# Patient Record
Sex: FEMALE | Race: BLACK OR AFRICAN AMERICAN | NOT HISPANIC OR LATINO | URBAN - METROPOLITAN AREA
[De-identification: names, ages, dates, MRNs, and addresses within clinical notes are randomized per-mention and may not be internally consistent; named-entity substitution may affect disease eponyms.]

---

## 2024-09-27 NOTE — PROGRESS NOTES
Subjective   Patient ID: Lulu Celaya is a 64 y.o. female    Chief Complaint: No chief complaint on file.       Last Surgery: No surgery found  Date of Last Surgery: No surgery found    HPI  Lulu Celaya is a 64 y.o. right hand dominant female presenting for left shoulder pain. This is a second opinion visit.     She is here today with complaints of left shoulder pain. She explains she is a  and hurt herself originally lifting heavy containers of food. She was seen by [facility] with Dr. Porras who had her begin therapy. While attending therapy she began to experience swelling in her neck and had to stop. She then followed up with Dr. Osullivan who informed her that she needed a shoulder replacement. Her right side is beginning to hurt due to over compensation      Objective   Patient is a well-developed, well-nourished female  in no acute distress.  Breathes with normal chest rises.  Pupils are round and symmetric today.  Awake, alert, and oriented x3.      Examination of the left shoulder today reveals the skin to be intact. There is no sign of any atrophy, lesions, or abrasions. There is no pain to palpation of the bony prominences. Cervical lymphadenopathy examined, and this was negative. Patient had 5 out of 5 wrist flexion, extension, and thumb extension bilaterally. Sensation was intact to light touch to median, ulnar, radial axillary, and musculocutaneous nerves bilaterally. Positive radial pulse bilaterally. Provocative maneuvers on the left side today were negative.  Range of motion of the left shoulder revealed 0-170° of forward elevation, 0-60° of external rotation, and internal rotation was to T-12.     Examination of the right shoulder today reveals the skin to be intact. There is no sign of any atrophy, lesions, or abrasions. There is no pain to palpation of the bony prominences. Cervical lymphadenopathy examined, and this was negative. Patient had 5 out of 5 wrist flexion,  extension, and thumb extension, bilaterally. Sensation was intact to light touch to median, ulnar, radial, axillary, and musculocutaneous nerves bilaterally. Positive radial pulse bilaterally. Provocative maneuvers on the right side today were negative.  Range of motion of the right shoulder revealed 0-170° of forward elevation, 0-60° of external rotation, and internal rotation up to T-12.    Imaging:  MRI of the left shoulder taken personally interpreted by me show the anterior and posterior rotator cuffs are intact. Superior rotator cuff tear retracted to the level of the glenoid. Grade 1 changes to the supraspinatus and infraspinatus    Assessment/Plan   No diagnosis found.  Patient with left shoulder pain that is consistent with a rotator cuff tear, that is a direct result of her original worker's compensation injury.     Patient is status post injury of their left shoulder. Recent MRI reveals a superior rotator cuff tear retracted to the level of the glenoid. Patient had a normal shoulder prior to injury. Through the most recent literature we know that early detection and treatment of rotator cuff tears have a better outcome and function long term. They have failed conservative management of anti-inflammatories, physical therapy and injections. The risks, benefits and alternatives of shoulder surgery were discussed at length. The risks of surgery are infection, dislocation, nerve injury, blood loss. The benefits are pain relief and restoration of function. The patient verbalize an understanding of the risks, benefits, alternatives and the expected outcomes and wishes to proceed with elective shoulder surgery at this time. The rehabilitation after surgery was discussed at length today. Rehabilitation after rotator cuff repair lasting a total of 5-6 months after surgery. Lifting is slowly progressed. They will be in a sling for the first month. The following 2-3 months would work on range of motion. No  strengthening until 3 months from surgery.     Patient requires lifting of over 10 pounds with their job, so they will have to either remain off work until restrictions are removed or go back with light duty work.    They will be scheduled for a pre operative follow up visit with RADHA Russell. This will be out-patient surgery. They will be fitted for the post operative sling at that time. They will get updated blood work done within 30 days of surgical date. They will need to be seen and cleared by the Anesthesia team prior to surgery date. All patient's questions were answered today to their satisfaction and they are in agreement with the above plan. They know how to reach the office if there are any additional questions prior to surgery date.     PLAN for LEFT shoulder arthroscopic decompression, debridement, rotator cuff repair with patch implant augmentation and open biceps tenodesis     We had a long discussion regarding her diagnosis. We talked about her treatment options. It is my medical opinion that we can repair her rotator cuff. We will submit a C9 request and she will bee seen by Ella for her pre operative visit. She understands that she cannot be seen until worker's compensation approves her surgery.     No orders of the defined types were placed in this encounter.        Follow up will be scheduled appropriately.     Scribe Attestation  By signing my name below, IRobyn Scribe   attest that this documentation has been prepared under the direction and in the presence of Gaurang Meyer MD.

## 2024-10-03 ENCOUNTER — HOSPITAL ENCOUNTER (OUTPATIENT)
Dept: RADIOLOGY | Facility: HOSPITAL | Age: 64
Discharge: HOME | End: 2024-10-03
Payer: COMMERCIAL

## 2024-10-03 ENCOUNTER — OFFICE VISIT (OUTPATIENT)
Dept: ORTHOPEDIC SURGERY | Facility: HOSPITAL | Age: 64
End: 2024-10-03
Payer: COMMERCIAL

## 2024-10-03 DIAGNOSIS — M25.512 LEFT SHOULDER PAIN, UNSPECIFIED CHRONICITY: ICD-10-CM

## 2024-10-03 DIAGNOSIS — M25.512 LEFT SHOULDER PAIN, UNSPECIFIED CHRONICITY: Primary | ICD-10-CM

## 2024-10-03 PROCEDURE — 99214 OFFICE O/P EST MOD 30 MIN: CPT | Performed by: ORTHOPAEDIC SURGERY

## 2024-10-03 PROCEDURE — 99204 OFFICE O/P NEW MOD 45 MIN: CPT | Performed by: ORTHOPAEDIC SURGERY

## 2024-10-31 ENCOUNTER — OFFICE VISIT (OUTPATIENT)
Dept: ORTHOPEDIC SURGERY | Facility: HOSPITAL | Age: 64
End: 2024-10-31
Payer: COMMERCIAL

## 2024-10-31 VITALS — BODY MASS INDEX: 35.26 KG/M2 | HEIGHT: 63 IN | WEIGHT: 199 LBS

## 2024-10-31 DIAGNOSIS — S46.012A TRAUMATIC COMPLETE TEAR OF LEFT ROTATOR CUFF, INITIAL ENCOUNTER: Primary | ICD-10-CM

## 2024-10-31 PROCEDURE — 99214 OFFICE O/P EST MOD 30 MIN: CPT | Performed by: NURSE PRACTITIONER

## 2024-10-31 PROCEDURE — L3670 SO ACRO/CLAV CAN WEB PRE OTS: HCPCS | Performed by: NURSE PRACTITIONER

## 2024-10-31 RX ORDER — ATORVASTATIN CALCIUM 20 MG/1
TABLET, FILM COATED ORAL
COMMUNITY
Start: 2024-10-30

## 2024-10-31 RX ORDER — LISINOPRIL 10 MG/1
TABLET ORAL
COMMUNITY
Start: 2024-09-06

## 2024-11-04 PROBLEM — S46.012A TRAUMATIC COMPLETE TEAR OF LEFT ROTATOR CUFF: Status: ACTIVE | Noted: 2024-10-31

## 2024-11-04 NOTE — PROGRESS NOTES
Provider Impression/Assessment:  Patient with left shoulder pain that is consistent with a rotator cuff tear, that is a direct result of her original worker's compensation injury.     Patient Discussion/Plan:  Lulu Celaya is status post injury of their left shoulder. Patient had a normal shoulder prior to injury. Through the most recent literature we know that early detection and treatment of rotator cuff tears have a better outcome and function long term. They have failed conservative management of anti-inflammatories, physical therapy and injections. The risks, benefits and alternatives of shoulder surgery were discussed at length with Dr Meyer previously. These were reviewed today. The risks of surgery are infection, dislocation, nerve injury, blood loss. The benefits are pain relief and restoration of function. The patient verbalize an understanding of the risks, benefits, alternatives and the expected outcomes and wishes to proceed with elective shoulder surgery at this time. The rehabilitation after surgery was discussed at length today. Rehabilitation after rotator cuff repair lasting a total of 5-6 months after surgery. Lifting is slowly progressed. They will be in a sling for the first month. The following 2-3 months would work on range of motion. No strengthening until 3 months from surgery.     Patient requires lifting of over 20 pounds with their job, so they will have to either remain off work until restrictions are removed or go back with light duty work.    PLAN for LEFT shoulder arthroscopic decompression, debridement, rotator cuff repair with patch implant augmentation and open biceps tenodesis     At this point we will plan for surgery on 1/23/25 at [facility]. We gave the patient a handout today on arthroscopic rotator cuff repair. This will be out-patient surgery. They were fitted for the post operative sling today. They will need to be seen and cleared by the  Anesthesia team prior to surgery date.     All patient's questions were answered today to their satisfaction and they are in agreement with the above plan. They know how to reach the office if there are any additional questions prior to surgery date. Patient was discussed with Dr Meyer and he is in agreement with the plan.     Should you have any questions or concerns after your visit today, please do not hesitate to call the office at [phone]. I am honored to be a provider on your health care team and remain dedicated to helping you achieve your healthcare goals  -------------------------------------------------------------------------------------------------  CHIEF COMPLAINT:  FUV - LEFT SHOULDER  Pre-Op Evaluation  [facility]    History of Present Illness:  10/3/24  Lulu Celaya is a 64 y.o. right hand dominant female presenting for left shoulder pain. This is a second opinion visit.      She is here today with complaints of left shoulder pain. She explains she is a  and hurt herself originally lifting heavy containers of food. She was seen by [facility] with Dr. Porras who had her begin therapy. While attending therapy she began to experience swelling in her neck and had to stop. She then followed up with Dr. Osullivan who informed her that she needed a shoulder replacement. Her right side is beginning to hurt due to over compensation      10/31/24  Lulu is a pleasant 64 y.o. right hand dominant female returning today for evaluation of their left shoulder. This is a Workers Compensation injury. She is here today to schedule arthroscopic shoulder surgery for her left side. She has now been approved through Workers Compensation to undergo surgery. She has been approved for her post operative physical therapy as well as her sling. We will get her the sling today. She has not been approved for the ice therapy machine. We will submit for this today. She continues to have daily pain with her  left shoulder. It wakes her from sleep. Denies issues with her right shoulder today. She has completed formal physical therapy at this point without any further improvement.     Denies any current heart or lung issues  Smoker   Non Diabetic  BMI: 35    Review of Systems:   Review of systems for all 14 systems is negative for complaint except for the above noted findings in the history of present illness.    Family, social, and medical histories are obtained and reviewed.    Allergies:  No Known Allergies    No past medical history on file.    No past surgical history on file.    Social History     Socioeconomic History    Marital status: Single     Spouse name: Not on file    Number of children: Not on file    Years of education: Not on file    Highest education level: Not on file   Occupational History    Not on file   Tobacco Use    Smoking status: Some Days     Types: Cigarettes    Smokeless tobacco: Never   Substance and Sexual Activity    Alcohol use: Not on file    Drug use: Not on file    Sexual activity: Not on file   Other Topics Concern    Not on file   Social History Narrative    Not on file     Social Drivers of Health     Financial Resource Strain: Not on file   Food Insecurity: Not on file   Transportation Needs: Not on file   Physical Activity: Not on file   Stress: Not on file   Social Connections: Not on file   Intimate Partner Violence: Not on file   Housing Stability: Not on file       Medications:    Current Outpatient Medications:     atorvastatin (Lipitor) 20 mg tablet, , Disp: , Rfl:     lisinopril 10 mg tablet, , Disp: , Rfl:     Family History:  No family history on file.      Diagnoses/Problems:  Pre-operative evaluation  Left rotator cuff tear    Physical Examination:  Patient is a well-developed, well-nourished female in no acute distress. Breathes with normal chest rises. Pupils are round and symmetric today. Awake, alert, and oriented x3. Patient had 5 out of 5 wrist flexors extension,  thumb extension bilaterally.      Examination of the right shoulder today reveals the skin to be intact. There is no sign any atrophy lesions or abrasions. There is no pain to palpation of the bony prominences. Provocative maneuvers on the right side today were negative. Range of motion of the left shoulder revealed 0-170° of forward elevation. 0-60° of external rotation. Internal rotation was to T 12.     Examination of the left shoulder today reveals the skin to be intact. There is no sign any atrophy lesions or abrasions. Pain to palpation along the greater tuberosity. Palpable pain over bicipital groove. Range of motion of the left shoulder revealed 90° of forward elevation, correctable to 130° with pain. 50° of external rotation. Internal rotation to L3 with pain. +Neers sign. +Sanchez.     Results/Data  MRI of the left shoulder shows anterior and posterior rotator cuffs are intact. Superior rotator cuff tear retracted to the level of the glenoid. Grade 1 changes to the supraspinatus and infraspinatus. Previously reviewed with Dr Meyer.      MEDICAL EQUIPMENT:  Patient was prescribed a shoulder sling for postoperative care. The patient will have weakness, instability and/or deformity of their left arm which requires stabilization from this orthosis to improve their function after surgery.     Verbal and written instructions for the use, wear schedule, cleaning and application of this item were given. Patient was instructed that should the brace result in increased pain, decreased sensation, increased swelling, or an overall worsening of their medical condition, to please contact our office immediately at [phone].     Orthotic management and training was provided for skin care, modifications due to healing tissues, edema changes, interruption in skin integrity, and safety precautions with the orthosis.      *While intending to generate a timely document that accurately reflects the content of the visit,  no guarantee can be provided that every grammatical or spelling mistake has been or will be identified or corrected. Thank you for your understanding.

## 2024-11-19 ENCOUNTER — APPOINTMENT (OUTPATIENT)
Dept: ORTHOPEDIC SURGERY | Facility: HOSPITAL | Age: 64
End: 2024-11-19
Payer: COMMERCIAL

## 2024-12-03 DIAGNOSIS — M75.102 TEAR OF LEFT ROTATOR CUFF, UNSPECIFIED TEAR EXTENT, UNSPECIFIED WHETHER TRAUMATIC: Primary | ICD-10-CM

## 2024-12-13 ENCOUNTER — APPOINTMENT (OUTPATIENT)
Dept: PREADMISSION TESTING | Facility: HOSPITAL | Age: 64
End: 2024-12-13
Payer: COMMERCIAL

## 2025-01-03 ENCOUNTER — PRE-ADMISSION TESTING (OUTPATIENT)
Dept: PREADMISSION TESTING | Facility: HOSPITAL | Age: 65
End: 2025-01-03
Payer: COMMERCIAL

## 2025-01-03 VITALS
BODY MASS INDEX: 35.94 KG/M2 | HEART RATE: 86 BPM | WEIGHT: 202.82 LBS | SYSTOLIC BLOOD PRESSURE: 112 MMHG | TEMPERATURE: 98.6 F | HEIGHT: 63 IN | RESPIRATION RATE: 18 BRPM | OXYGEN SATURATION: 97 % | DIASTOLIC BLOOD PRESSURE: 80 MMHG

## 2025-01-03 DIAGNOSIS — S46.012A TRAUMATIC COMPLETE TEAR OF LEFT ROTATOR CUFF, INITIAL ENCOUNTER: ICD-10-CM

## 2025-01-03 DIAGNOSIS — Z01.818 PREOP TESTING: Primary | ICD-10-CM

## 2025-01-03 LAB
ALBUMIN SERPL BCP-MCNC: 4.2 G/DL (ref 3.4–5)
ALP SERPL-CCNC: 78 U/L (ref 33–136)
ALT SERPL W P-5'-P-CCNC: 15 U/L (ref 7–45)
ANION GAP SERPL CALC-SCNC: 13 MMOL/L (ref 10–20)
AST SERPL W P-5'-P-CCNC: 14 U/L (ref 9–39)
BILIRUB SERPL-MCNC: 0.8 MG/DL (ref 0–1.2)
BUN SERPL-MCNC: 20 MG/DL (ref 6–23)
CALCIUM SERPL-MCNC: 8.9 MG/DL (ref 8.6–10.3)
CHLORIDE SERPL-SCNC: 98 MMOL/L (ref 98–107)
CO2 SERPL-SCNC: 27 MMOL/L (ref 21–32)
CREAT SERPL-MCNC: 0.79 MG/DL (ref 0.5–1.05)
EGFRCR SERPLBLD CKD-EPI 2021: 83 ML/MIN/1.73M*2
ERYTHROCYTE [DISTWIDTH] IN BLOOD BY AUTOMATED COUNT: 12.6 % (ref 11.5–14.5)
GLUCOSE SERPL-MCNC: 105 MG/DL (ref 74–99)
HCT VFR BLD AUTO: 39.5 % (ref 36–46)
HGB BLD-MCNC: 12.8 G/DL (ref 12–16)
MCH RBC QN AUTO: 30.3 PG (ref 26–34)
MCHC RBC AUTO-ENTMCNC: 32.4 G/DL (ref 32–36)
MCV RBC AUTO: 94 FL (ref 80–100)
NRBC BLD-RTO: NORMAL /100{WBCS}
PLATELET # BLD AUTO: 309 X10*3/UL (ref 150–450)
POTASSIUM SERPL-SCNC: 3 MMOL/L (ref 3.5–5.3)
PROT SERPL-MCNC: 7.2 G/DL (ref 6.4–8.2)
RBC # BLD AUTO: 4.22 X10*6/UL (ref 4–5.2)
SODIUM SERPL-SCNC: 135 MMOL/L (ref 136–145)
WBC # BLD AUTO: 7.4 X10*3/UL (ref 4.4–11.3)

## 2025-01-03 PROCEDURE — 84075 ASSAY ALKALINE PHOSPHATASE: CPT

## 2025-01-03 PROCEDURE — 36415 COLL VENOUS BLD VENIPUNCTURE: CPT

## 2025-01-03 PROCEDURE — 93005 ELECTROCARDIOGRAM TRACING: CPT

## 2025-01-03 PROCEDURE — 85027 COMPLETE CBC AUTOMATED: CPT

## 2025-01-03 RX ORDER — CHLORHEXIDINE GLUCONATE ORAL RINSE 1.2 MG/ML
15 SOLUTION DENTAL 2 TIMES DAILY
COMMUNITY
Start: 2024-12-12

## 2025-01-03 RX ORDER — IBUPROFEN 200 MG
600 TABLET ORAL EVERY 8 HOURS PRN
COMMUNITY

## 2025-01-03 RX ORDER — CHLORTHALIDONE 25 MG/1
25 TABLET ORAL
COMMUNITY
Start: 2024-09-06

## 2025-01-03 RX ORDER — ASPIRIN 81 MG/1
81 TABLET ORAL DAILY PRN
COMMUNITY

## 2025-01-03 ASSESSMENT — DUKE ACTIVITY SCORE INDEX (DASI)
CAN YOU DO YARD WORK LIKE RAKING LEAVES, WEEDING OR PUSHING A MOWER: NO
CAN YOU WALK INDOORS, SUCH AS AROUND YOUR HOUSE: YES
CAN YOU CLIMB A FLIGHT OF STAIRS OR WALK UP A HILL: YES
CAN YOU HAVE SEXUAL RELATIONS: NO
CAN YOU DO HEAVY WORK AROUND THE HOUSE LIKE SCRUBBING FLOORS OR LIFTING AND MOVING HEAVY FURNITURE: NO
CAN YOU PARTICIPATE IN MODERATE RECREATIONAL ACTIVITIES LIKE GOLF, BOWLING, DANCING, DOUBLES TENNIS OR THROWING A BASEBALL OR FOOTBALL: NO
DASI METS SCORE: 6.1
CAN YOU DO LIGHT WORK AROUND THE HOUSE LIKE DUSTING OR WASHING DISHES: YES
CAN YOU PARTICIPATE IN STRENOUS SPORTS LIKE SWIMMING, SINGLES TENNIS, FOOTBALL, BASKETBALL, OR SKIING: NO
CAN YOU WALK A BLOCK OR TWO ON LEVEL GROUND: YES
TOTAL_SCORE: 26.95
CAN YOU DO MODERATE WORK AROUND THE HOUSE LIKE VACUUMING, SWEEPING FLOORS OR CARRYING GROCERIES: YES
CAN YOU TAKE CARE OF YOURSELF (EAT, DRESS, BATHE, OR USE TOILET): YES
CAN YOU RUN A SHORT DISTANCE: YES

## 2025-01-03 ASSESSMENT — ENCOUNTER SYMPTOMS
CONSTITUTIONAL NEGATIVE: 1
CARDIOVASCULAR NEGATIVE: 1
GASTROINTESTINAL NEGATIVE: 1
EYES NEGATIVE: 1
RESPIRATORY NEGATIVE: 1
NECK NEGATIVE: 1
ENDOCRINE NEGATIVE: 1
NEUROLOGICAL NEGATIVE: 1

## 2025-01-03 ASSESSMENT — LIFESTYLE VARIABLES: SMOKING_STATUS: SMOKER

## 2025-01-03 ASSESSMENT — PAIN SCALES - GENERAL: PAINLEVEL_OUTOF10: 7

## 2025-01-03 ASSESSMENT — PAIN DESCRIPTION - DESCRIPTORS: DESCRIPTORS: ACHING;SHARP

## 2025-01-03 ASSESSMENT — PAIN - FUNCTIONAL ASSESSMENT: PAIN_FUNCTIONAL_ASSESSMENT: 0-10

## 2025-01-03 NOTE — PREPROCEDURE INSTRUCTIONS
Medication List            Accurate as of January 3, 2025  3:16 PM. Always use your most recent med list.                aspirin 81 mg EC tablet  Additional Medication Adjustments for Surgery: Take last dose 7 days before surgery  Notes to patient: Last dose 01/15/2025     atorvastatin 20 mg tablet  Commonly known as: Lipitor  Medication Adjustments for Surgery: Take/Use as prescribed     chlorhexidine 0.12 % solution  Commonly known as: Peridex     chlorthalidone 25 mg tablet  Commonly known as: Hygroton  Medication Adjustments for Surgery: Take/Use as prescribed     ibuprofen 200 mg tablet  Additional Medication Adjustments for Surgery: Take last dose 7 days before surgery  Notes to patient: Last dose 01/15/2025     lisinopril 10 mg tablet  Medication Adjustments for Surgery: Take last dose 1 day (24 hours) before surgery  Notes to patient: Last dose 1/22/25 morning              Thank you for visiting [facility].  If you have any changes to your health condition, please call the surgeons office to alert them and give them details of your symptoms.    La Nena Ordonez PA-C  P: [phone]  Department of Anesthesiology and Perioperative Medicine  --    Preoperative Fasting Guidelines    Why must I stop eating and drinking near surgery time?  With sedation, food or liquid in your stomach can enter your lungs causing serious complications  Increases nausea and vomiting    When do I need to stop eating and drinking before my surgery?  Do not eat any food after midnight the night before your surgery/procedure.  You may have up to 13.5 ounces of clear liquid until TWO hours before your instructed arrival time to the hospital.  This includes water, black tea/coffee, (no milk or cream) apple juice, and electrolyte drinks (Gatorade)  You may chew gum until TWO hours before your surgery/procedure              Preoperative Brain Exercises    What are brain exercises?  A brain exercise is any activity  that engages your thinking (cognitive) skills.    What types of activities are considered brain exercises?  Jigsaw puzzles, crossword puzzles, word jumble, memory games, word search, and many more.  Many can be found free online or on your phone via a mobile sweetie.    Why should I do brain exercises before my surgery?  More recent research has shown brain exercise before surgery can lower the risk of postoperative delirium (confusion) which can be especially important for older adults.  Patients who did brain exercises for 5 to 10 hours the days before surgery, cut their risk of postoperative delirium in half up to 1 week after surgery.                  [facility]    Preoperative Deep Breathing Exercises    Why it is important to do deep breathing exercises before my surgery?  Deep breathing exercises strengthen your breathing muscles.  This helps you to recover after your surgery and decreases the chance of breathing complications.      How are the deep breathing exercises done?  Sit straight with your back supported.  Breathe in deeply and slowly through your nose. Your lower rib cage should expand and your abdomen may move forward.  Hold that breath for 3 to 5 seconds.  Breathe out through pursed lips, slowly and completely.  Rest and repeat 10 times every hour while awake.  Rest longer if you become dizzy or lightheaded.      Patient Information: Incentive Spirometer  What is an incentive spirometer?  An incentive spirometer is a device used before and after surgery to “exercise” your lungs.  It helps you to take deeper breaths to expand your lungs.  Below is an example of a basic incentive spirometer.  The device you receive may differ slightly but they all function the same.    Why do I need to use an incentive spirometer?  Using your incentive spirometer prepares your lungs for surgery and helps prevent lung problems after surgery.  How do I use my incentive spirometer?  When you're  using your incentive spirometer, make sure to breathe through your mouth. If you breathe through your nose, the incentive spirometer won't work properly. You can hold your nose if you have trouble.  If you feel dizzy at any time, stop and rest. Try again at a later time.  Follow the steps below:  Set up your incentive spirometer, expand the flexible tubing and connect to the outlet.  Sit upright in a chair or bed. Hold the incentive spirometer at eye level.   Put the mouthpiece in your mouth and close your lips tightly around it. Slowly breathe out (exhale) completely.  Breathe in (inhale) slowly through your mouth as deeply as you can. As you take a breath, you will see the piston rise inside the large column. While the piston rises, the indicator should move upwards. It should stay in between the 2 arrows (see Figure).  Try to get the piston as high as you can, while keeping the indicator between the arrows.   If the indicator doesn't stay between the arrows, you're breathing either too fast or too slow.  When you get it as high as you can, hold your breath for 10 seconds, or as long as possible. While you're holding your breath, the piston will slowly fall to the base of the spirometer.  Once the piston reaches the bottom of the spirometer, breathe out slowly through your mouth. Rest for a few seconds.  Repeat 10 times. Try to get the piston to the same level with each breath.  Repeat every hour while awake  You can carefully clean the outside of the mouthpiece with an alcohol wipe or soap and water.            Patient and Family Education             Ways You Can Help Prevent Blood Clots             This handout explains some simple things you can do to help prevent blood clots.      Blood clots are blockages that can form in the body's veins. When a blood clot forms in your deep veins, it may be called a deep vein thrombosis, or DVT for short. Blood clots can happen in any part of the body where blood flows,  but they are most common in the arms and legs. If a piece of a blood clot breaks free and travels to the lungs, it is called a pulmonary embolus (PE). A PE can be a very serious problem.         Being in the hospital or having surgery can raise your chances of getting a blood clot because you may not be well enough to move around as much as you normally do.         Ways you can help prevent blood clots in the hospital         Wearing SCDs. SCDs stands for Sequential Compression Devices.   SCDs are special sleeves that wrap around your legs  They attach to a pump that fills them with air to gently squeeze your legs every few minutes.   This helps return the blood in your legs to your heart.   SCDs should only be taken off when walking or bathing.   SCDs may not be comfortable, but they can help save your life.               Wearing compression stockings - if your doctor orders them. These special snug fitting stockings gently squeeze your legs to help blood flow.       Walking. Walking helps move the blood in your legs.   If your doctor says it is ok, try walking the halls at least   5 times a day. Ask us to help you get up, so you don't fall.      Taking any blood thinning medicines your doctor orders.             ©[facility]; 3/23       Ways you can help prevent blood clots at home       Wearing compression stockings - if your doctor orders them. ? Walking - to help move the blood in your legs.       Taking any blood thinning medicines your doctor orders.      Signs of a blood clot or PE      Tell your doctor or nurse know right away if you have of the problems listed below.    If you are at home, seek medical care right away. Call 911 for chest pain or problems breathing.               Signs of a blood clot (DVT) - such as pain,  swelling, redness or warmth in your arm or leg      Signs of a pulmonary embolism (PE) - such as chest     pain or feeling short of breath           The Week before Surgery         Seven days before Surgery  Check your C[facility] medication instructions  Do the exercises provided to you by CPM   Arrange for a responsible, adult licensed  to take you home after surgery and stay with you for 24 hours.  You will not be permitted to drive yourself home if you have received any anesthetic/sedation  Six days before surgery  Check your [facility]PM medication instructions  Do the exercises provided to you by [facility]   Start using Chlorhexidene (CHG) body wash if prescribed  Five days before surgery  Check your [facility] medication instructions  Do the exercises provided to you by [facility]   Continue to use CHG body wash if prescribed  Three days before surgery  Check your CPM medication instructions  Do the exercises provided to you by C[facility]   Continue to use CHG body wash if prescribed  Two days before surgery  Check your CPM medication instructions  Do the exercises provided to you by C[facility]   Continue to use CHG body wash if prescribed    The Day before Surgery       Check your CPM medication and all other CPM instructions including when to stop eating and drinking  You will be called with your arrival time for surgery in the late afternoon.  If you do not receive a call please reach out to your surgeon's office.  Do not smoke or drink 24 hours before surgery  Prepare items to bring with you to the hospital  Shower with your chlorhexidine wash if prescribed  Brush your teeth and use your chlorhexidine dental rinse if prescribed    The Day of Surgery       Check your CPM medication instructions  Ensure you follow the instructions for when to stop eating and drinking  Shower, if prescribed use CHG.  Do not apply any lotions, creams, moisturizers, perfume or deodorant  Brush your teeth and use your CHG dental rinse if prescribed  Wear loose comfortable clothing  Avoid make-up  Remove  jewelry and piercings, consider professional piercing removal with a plastic spacer if needed  Bring photo ID and Insurance card  Bring an  accurate medication list that includes medication dose, frequency and allergies  Bring a copy of your advanced directives (will, health care power of )  Bring any devices and controllers as well as medical devices you have been provided with for surgery (CPAP, slings, braces, etc.)  Dentures, eyeglasses, and contacts will be removed before surgery, please bring cases for contacts or glasses

## 2025-01-03 NOTE — CPM/PAT H&P
CPM/PAT Evaluation       Name: Lulu Celaya (Lulu Celaya)  /Age: 1960/64 y.o.     Visit Type:   In-Person       Chief Complaint: left shoulder pain    HPI: Patient is a 63 yo F scheduled for left shoulder arthroscopic rotator cuff repair with collagen implant, decompression, debridement and biceps tenodesis on 2025 with Dr. Meyer secondary to traumatic tear of left rotator cuff. Patient was referred by Dr. Meyer to [facility] today for perioperative risk stratification and optimization. Patient's PMHx is notable for HTN, HLD.      Past Medical History:   Diagnosis Date    Hyperlipidemia     Hypertension        Past Surgical History:   Procedure Laterality Date    COLONOSCOPY      UPPER GASTROINTESTINAL ENDOSCOPY         Patient  has no history on file for sexual activity.    Family History   Problem Relation Name Age of Onset    Breast cancer Mother      Heart disease Father      Heart attack Father      Heart disease Paternal Grandfather         No Known Allergies    Prior to Admission medications    Medication Sig Start Date End Date Taking? Authorizing Provider   atorvastatin (Lipitor) 20 mg tablet  10/30/24   Historical Provider, MD   lisinopril 10 mg tablet  24   Historical Provider, MD ASHTON ROS:   Constitutional:   neg    Neuro/Psych:   neg    Eyes:   neg    Ears:   neg    Nose:   neg    Mouth:   neg    Throat:   neg    Neck:   neg    Cardio:   neg    Respiratory:   neg    Endocrine:   neg    GI:   neg    :   neg    Musculoskeletal:    + left shoulder pain  Hematologic:   neg    Skin:  neg        Physical Exam  Vitals and nursing note reviewed.   Constitutional:       General: She is not in acute distress.     Appearance: She is not ill-appearing or toxic-appearing.   HENT:      Head: Normocephalic and atraumatic.      Nose: Nose normal.      Mouth/Throat:      Mouth: Mucous membranes are moist.   Eyes:      Extraocular Movements: Extraocular movements intact.       "Conjunctiva/sclera: Conjunctivae normal.   Neck:      Vascular: No carotid bruit.   Cardiovascular:      Rate and Rhythm: Normal rate and regular rhythm.      Pulses: Normal pulses.      Heart sounds: Normal heart sounds. No murmur heard.  Pulmonary:      Effort: Pulmonary effort is normal.      Breath sounds: Normal breath sounds.   Abdominal:      General: There is no distension.      Palpations: Abdomen is soft.      Tenderness: There is no abdominal tenderness.   Musculoskeletal:         General: Normal range of motion.      Cervical back: Normal range of motion.   Skin:     General: Skin is warm and dry.      Capillary Refill: Capillary refill takes less than 2 seconds.   Neurological:      General: No focal deficit present.      Mental Status: She is alert.   Psychiatric:         Mood and Affect: Mood normal.         Behavior: Behavior normal.          PAT AIRWAY:   Airway:     Mallampati::  II    TM distance::  >3 FB    Neck ROM::  Full   No bottom teeth        Visit Vitals  /80   Pulse 86   Temp 37 °C (98.6 °F) (Temporal)   Resp 18   Ht 1.6 m (5' 3\")   Wt 92 kg (202 lb 13.2 oz)   SpO2 97%   BMI 35.93 kg/m²   Smoking Status Some Days   BSA 2.02 m²       DASI Risk Score      Flowsheet Row Pre-Admission Testing from 1/3/2025 in [facility]   Can you take care of yourself (eat, dress, bathe, or use toilet)?  2.75 filed at 01/03/2025 1540   Can you walk indoors, such as around your house? 1.75 filed at 01/03/2025 1540   Can you walk a block or two on level ground?  2.75 filed at 01/03/2025 1540   Can you climb a flight of stairs or walk up a hill? 5.5 filed at 01/03/2025 1540   Can you run a short distance? 8 filed at 01/03/2025 1540   Can you do light work around the house like dusting or washing dishes? 2.7 filed at 01/03/2025 1540   Can you do moderate work around the house like vacuuming, sweeping floors or carrying groceries? 3.5 filed at 01/03/2025 1540   Can you do heavy work around " the house like scrubbing floors or lifting and moving heavy furniture?  0 filed at 01/03/2025 1540   Can you do yard work like raking leaves, weeding or pushing a mower? 0 filed at 01/03/2025 1540   Can you have sexual relations? 0 filed at 01/03/2025 1540   Can you participate in moderate recreational activities like golf, bowling, dancing, doubles tennis or throwing a baseball or football? 0 filed at 01/03/2025 1540   Can you participate in strenous sports like swimming, singles tennis, football, basketball, or skiing? 0 filed at 01/03/2025 1540   DASI SCORE 26.95 filed at 01/03/2025 1540   METS Score (Will be calculated only when all the questions are answered) 6.1 filed at 01/03/2025 1540          Caprini DVT Assessment      Flowsheet Row Pre-Admission Testing from 1/3/2025 in [facility]   DVT Score 7 filed at 01/03/2025 1539   Surgical Factors Major surgery planned, including arthroscopic and laproscopic (1-2 hours) filed at 01/03/2025 1539   BMI 31-40 (Obesity) filed at 01/03/2025 1539          Modified Frailty Index      Flowsheet Row Pre-Admission Testing from 1/3/2025 in [facility]   Non-independent functional status (problems with dressing, bathing, personal grooming, or cooking) 0 filed at 01/03/2025 1541   History of diabetes mellitus  0 filed at 01/03/2025 1541   History of COPD 0 filed at 01/03/2025 1541   History of CHF No filed at 01/03/2025 1541   History of MI 0 filed at 01/03/2025 1541   History of Percutaneous Coronary Intervention, Cardiac Surgery, or Angina No filed at 01/03/2025 1541   Hypertension requiring the use of medication  0.0909 filed at 01/03/2025 1541   Peripheral vascular disease 0 filed at 01/03/2025 1541   Impaired sensorium (cognitive impairement or loss, clouding, or delirium) 0 filed at 01/03/2025 1541   TIA or CVA withouy residual deficit 0 filed at 01/03/2025 1541   Cerebrovascular accident with deficit 0 filed at 01/03/2025 1541   Modified  Frailty Index Calculator .0909 filed at 01/03/2025 1541          CHADS2 Stroke Risk  Current as of 18 minutes ago        N/A 3 to 100%: High Risk   2 to < 3%: Medium Risk   0 to < 2%: Low Risk     Last Change: N/A          This score determines the patient's risk of having a stroke if the patient has atrial fibrillation.        This score is not applicable to this patient. Components are not calculated.          Revised Cardiac Risk Index      Flowsheet Row Pre-Admission Testing from 1/3/2025 in [facility]   High-Risk Surgery (Intraperitoneal, Intrathoracic,Suprainguinal vascular) 0 filed at 01/03/2025 1541   History of ischemic heart disease (History of MI, History of positive exercuse test, Current chest paint considered due to myocardial ischemia, Use of nitrate therapy, ECG with pathological Q Waves) 0 filed at 01/03/2025 1541   History of congestive heart failure (pulmonary edemia, bilateral rales or S3 gallop, Paroxysmal nocturnal dyspnea, CXR showing pulmonary vascular redistribution) 0 filed at 01/03/2025 1541   History of cerebrovascular disease (Prior TIA or stroke) 0 filed at 01/03/2025 1541   Pre-operative insulin treatment 0 filed at 01/03/2025 1541   Pre-operative creatinine>2 mg/dl 0 filed at 01/03/2025 1541   Revised Cardiac Risk Calculator 0 filed at 01/03/2025 1541          Apfel Simplified Score      Flowsheet Row Pre-Admission Testing from 1/3/2025 in [facility]   Smoking status 0 filed at 01/03/2025 1541   History of motion sickness or PONV  0 filed at 01/03/2025 1541   Use of postoperative opioids 0 filed at 01/03/2025 1541   Gender - Female 1=Yes filed at 01/03/2025 1541   Apfel Simplified Score Calculator 1 filed at 01/03/2025 1541          Risk Analysis Index Results This Encounter    No data found in the last 10 encounters.       Stop Bang Score      Flowsheet Row Pre-Admission Testing from 1/3/2025 in [facility]   Do you snore loudly? 1  filed at 01/03/2025 1514   Do you often feel tired or fatigued after your sleep? 0 filed at 01/03/2025 1514   Has anyone ever observed you stop breathing in your sleep? 0 filed at 01/03/2025 1514   Do you have or are you being treated for high blood pressure? 1 filed at 01/03/2025 1514   Recent BMI (Calculated) 35.9 filed at 01/03/2025 1514   Is BMI greater than 35 kg/m2? 1=Yes filed at 01/03/2025 1514   Age older than 50 years old? 1=Yes filed at 01/03/2025 1514   Is your neck circumference greater than 17 inches (Male) or 16 inches (Female)? 0 filed at 01/03/2025 1514   Gender - Male 0=No filed at 01/03/2025 1514   STOP-BANG Total Score 4 filed at 01/03/2025 1514          Prodigy: High Risk  Total Score: 8              Prodigy Age Score           ARISCAT Score for Postoperative Pulmonary Complications      Flowsheet Row Pre-Admission Testing from 1/3/2025 in [facility]   Age, years  3 filed at 01/03/2025 1542   Preoperative SpO2 0 filed at 01/03/2025 1542   Respiratory infection in the last month Either upper or lower (i.e., URI, bronchitis, pneumonia), with fever and antibiotic treatment 0 filed at 01/03/2025 1542   Preoperative anemoa (Hgb less than 10 g/dl) 0 filed at 01/03/2025 1542   Surgical incision  0 filed at 01/03/2025 1542   Duration of surgery  16 filed at 01/03/2025 1542   Emergency Procedure  0 filed at 01/03/2025 1542   ARISCAT Total Score  19 filed at 01/03/2025 1542          Valle Perioperative Risk for Myocardial Infarction or Cardiac Arrest (YEISON)      Flowsheet Row Pre-Admission Testing from 1/3/2025 in [facility]   Age 1.3 filed at 01/03/2025 1517   Functional Status  0 filed at 01/03/2025 1517   ASA Class  -3.29 filed at 01/03/2025 1517   Creatinine 0 filed at 01/03/2025 1517   Type of Procedure  0.80 filed at 01/03/2025 1517   YEISON Total Score  -6.44 filed at 01/03/2025 1517   YEISON % 0.16 filed at 01/03/2025 1517            Assessment & Plan    Neuro:   No  neurologic diagnoses on chart review, however, the patient is at an increased risk for post operative delirium secondary to age >/= 65.  Preoperative brain exercise educational handout provided to patient.    The patient is at an increased risk for perioperative stroke secondary to increased age, HTN, HLD, and female sex .    HEENT/Airway:   No diagnosis or significant findings on chart review or clinical presentation and evaluation.   STOP-BANG Score- 4 points high risk for LAW    Mallampati::  II    TM distance::  >3 FB    Neck ROM::  Full  Dentures-denies  Crowns-denies  Implants-denies    Recently had bottom teeth pulled, states it will be more than 6 weeks    Cardiovascular:    -HTN - on lisinopril (24 hour hold), chlorthalidone (continue)  -HLD - on atorvastatin (continue)    METS: 6.3  RCRI: 0 points, 3.9%    30 day risk of MACE (risk for cardiac death, nonfatal myocardial infarction, and nonfactal cardiac arrest  YEISON:  0.16  % risk of intraoperative or 30-day postoperative MACE  EKG -normal sinus rhythm LAFB; Rate 77 ; reviewed EKG with chika Sahu to proceed without further work up. No additional preoperative testing is currently indicated.    Pulmonary:     No diagnosis or significant findings on chart review or clinical presentation and evaluation.   ARISCAT: <26 points, 1.6% risk of in-hospital postoperative pulmonary complication  PRODIGY: Moderate risk for opioid induced respiratory depression  Smoking History- Smokes 3 cigarettes every few days  Preoperative deep breathing educational handout provided to patient.    Renal:  No diagnosis or significant findings on chart review or clinical presentation and evaluation, however, the patient is at increased risk of perioperative renal complications secondary to age>/= 56 and HTN. Preventative measures include BP monitoring, medication compliance, and hydration management.   CMP-Pending    Endocrine:  No diagnosis or significant findings on chart review  or clinical presentation and evaluation.     Hematologic:    No diagnosis or significant findings on chart review or clinical presentation and evaluation.  The patient is not a Jehovah’s witness and will accept blood and blood products if medically indicated.   History of previous blood transfusions No  CBC-Pending    Caprini Score 7 , patient at High for postoperative DVT. Pt supplied education/VTE handout  Anticoagulation use: No   Preoperative DVT educational handout provided to patient.    Gastrointestinal:     No diagnosis or significant findings on chart review or clinical presentation and evaluation.   Recreational drug use: Drug use No  Alcohol use social drinker    Apfel: 1 points 21% risk for post operative N/V    Infectious disease:    No diagnosis or significant findings on chart review or clinical presentation and evaluation.     Musculoskeletal:    -left rotator cuff tear- scheduled for surgery    Anesthesia:  ASA 2 - Patient with mild systemic disease with no functional limitations    History of General anesthesia- yes  Complications- No anesthesia complications  No family history of anesthesia complications    Nickel/metal allergy-negative  Shellfish allergy-negative    Discussed with patient medication instructions, NPO guidelines, and any questions or concerns.       La Nena Ordonez PA-C 1/3/2025 4:29 PM      Pending labs ordered:  cbc and comp  Follow up needed: labs., EKG review

## 2025-01-06 LAB
ATRIAL RATE: 77 BPM
P AXIS: 74 DEGREES
P OFFSET: 175 MS
P ONSET: 119 MS
PR INTERVAL: 180 MS
Q ONSET: 209 MS
QRS COUNT: 13 BEATS
QRS DURATION: 96 MS
QT INTERVAL: 452 MS
QTC CALCULATION(BAZETT): 511 MS
QTC FREDERICIA: 491 MS
R AXIS: -53 DEGREES
T AXIS: 49 DEGREES
T OFFSET: 435 MS
VENTRICULAR RATE: 77 BPM

## 2025-01-08 ENCOUNTER — DOCUMENTATION (OUTPATIENT)
Dept: PREADMISSION TESTING | Facility: HOSPITAL | Age: 65
End: 2025-01-08
Payer: COMMERCIAL

## 2025-01-08 NOTE — PROGRESS NOTES
PAT labs reviewed, K+ was noted to be 3.0. Patient on Chlorthalidone with no current PCP. Dr Meyer was notified. Labs were otherwise normal.       See Soto, JOS-CNP

## 2025-01-22 ENCOUNTER — ANESTHESIA EVENT (OUTPATIENT)
Dept: OPERATING ROOM | Facility: HOSPITAL | Age: 65
End: 2025-01-22
Payer: COMMERCIAL

## 2025-01-23 ENCOUNTER — ANESTHESIA (OUTPATIENT)
Dept: OPERATING ROOM | Facility: HOSPITAL | Age: 65
End: 2025-01-23
Payer: COMMERCIAL

## 2025-01-23 ENCOUNTER — HOSPITAL ENCOUNTER (OUTPATIENT)
Facility: HOSPITAL | Age: 65
Setting detail: OUTPATIENT SURGERY
Discharge: HOME | End: 2025-01-23
Attending: ORTHOPAEDIC SURGERY | Admitting: ORTHOPAEDIC SURGERY
Payer: COMMERCIAL

## 2025-01-23 VITALS
TEMPERATURE: 97.9 F | HEIGHT: 63 IN | RESPIRATION RATE: 12 BRPM | OXYGEN SATURATION: 92 % | SYSTOLIC BLOOD PRESSURE: 119 MMHG | BODY MASS INDEX: 36.25 KG/M2 | HEART RATE: 66 BPM | DIASTOLIC BLOOD PRESSURE: 73 MMHG | WEIGHT: 204.59 LBS

## 2025-01-23 DIAGNOSIS — Z98.890 POST-OPERATIVE STATE: ICD-10-CM

## 2025-01-23 DIAGNOSIS — S46.012A TRAUMATIC COMPLETE TEAR OF LEFT ROTATOR CUFF, INITIAL ENCOUNTER: Primary | ICD-10-CM

## 2025-01-23 DIAGNOSIS — G89.18 ACUTE POST-OPERATIVE PAIN: Primary | ICD-10-CM

## 2025-01-23 PROCEDURE — 29826 SHO ARTHRS SRG DECOMPRESSION: CPT | Performed by: NURSE PRACTITIONER

## 2025-01-23 PROCEDURE — 29827 SHO ARTHRS SRG RT8TR CUF RPR: CPT | Performed by: NURSE PRACTITIONER

## 2025-01-23 PROCEDURE — 29827 SHO ARTHRS SRG RT8TR CUF RPR: CPT | Performed by: ORTHOPAEDIC SURGERY

## 2025-01-23 PROCEDURE — 2500000004 HC RX 250 GENERAL PHARMACY W/ HCPCS (ALT 636 FOR OP/ED): Mod: JZ | Performed by: NURSE PRACTITIONER

## 2025-01-23 PROCEDURE — 3600000009 HC OR TIME - EACH INCREMENTAL 1 MINUTE - PROCEDURE LEVEL FOUR: Performed by: ORTHOPAEDIC SURGERY

## 2025-01-23 PROCEDURE — 2720000007 HC OR 272 NO HCPCS: Performed by: ORTHOPAEDIC SURGERY

## 2025-01-23 PROCEDURE — 7100000010 HC PHASE TWO TIME - EACH INCREMENTAL 1 MINUTE: Performed by: ORTHOPAEDIC SURGERY

## 2025-01-23 PROCEDURE — 2500000005 HC RX 250 GENERAL PHARMACY W/O HCPCS: Performed by: ORTHOPAEDIC SURGERY

## 2025-01-23 PROCEDURE — 3700000002 HC GENERAL ANESTHESIA TIME - EACH INCREMENTAL 1 MINUTE: Performed by: ORTHOPAEDIC SURGERY

## 2025-01-23 PROCEDURE — 7100000001 HC RECOVERY ROOM TIME - INITIAL BASE CHARGE: Performed by: ORTHOPAEDIC SURGERY

## 2025-01-23 PROCEDURE — 2500000004 HC RX 250 GENERAL PHARMACY W/ HCPCS (ALT 636 FOR OP/ED): Performed by: NURSE ANESTHETIST, CERTIFIED REGISTERED

## 2025-01-23 PROCEDURE — 29823 SHO ARTHRS SRG XTNSV DBRDMT: CPT | Performed by: NURSE PRACTITIONER

## 2025-01-23 PROCEDURE — 2500000004 HC RX 250 GENERAL PHARMACY W/ HCPCS (ALT 636 FOR OP/ED): Performed by: STUDENT IN AN ORGANIZED HEALTH CARE EDUCATION/TRAINING PROGRAM

## 2025-01-23 PROCEDURE — 2780000003 HC OR 278 NO HCPCS: Performed by: ORTHOPAEDIC SURGERY

## 2025-01-23 PROCEDURE — 64415 NJX AA&/STRD BRCH PLXS IMG: CPT | Performed by: STUDENT IN AN ORGANIZED HEALTH CARE EDUCATION/TRAINING PROGRAM

## 2025-01-23 PROCEDURE — 29826 SHO ARTHRS SRG DECOMPRESSION: CPT | Performed by: ORTHOPAEDIC SURGERY

## 2025-01-23 PROCEDURE — 7100000009 HC PHASE TWO TIME - INITIAL BASE CHARGE: Performed by: ORTHOPAEDIC SURGERY

## 2025-01-23 PROCEDURE — 29823 SHO ARTHRS SRG XTNSV DBRDMT: CPT | Performed by: ORTHOPAEDIC SURGERY

## 2025-01-23 PROCEDURE — C1713 ANCHOR/SCREW BN/BN,TIS/BN: HCPCS | Performed by: ORTHOPAEDIC SURGERY

## 2025-01-23 PROCEDURE — 2500000004 HC RX 250 GENERAL PHARMACY W/ HCPCS (ALT 636 FOR OP/ED)

## 2025-01-23 PROCEDURE — 3700000001 HC GENERAL ANESTHESIA TIME - INITIAL BASE CHARGE: Performed by: ORTHOPAEDIC SURGERY

## 2025-01-23 PROCEDURE — A4649 SURGICAL SUPPLIES: HCPCS | Performed by: ORTHOPAEDIC SURGERY

## 2025-01-23 PROCEDURE — 7100000002 HC RECOVERY ROOM TIME - EACH INCREMENTAL 1 MINUTE: Performed by: ORTHOPAEDIC SURGERY

## 2025-01-23 PROCEDURE — 2500000004 HC RX 250 GENERAL PHARMACY W/ HCPCS (ALT 636 FOR OP/ED): Performed by: ORTHOPAEDIC SURGERY

## 2025-01-23 PROCEDURE — 3600000004 HC OR TIME - INITIAL BASE CHARGE - PROCEDURE LEVEL FOUR: Performed by: ORTHOPAEDIC SURGERY

## 2025-01-23 DEVICE — SUTURE, ANCHOR, 2.8MM, Q-FIX, ALL: Type: IMPLANTABLE DEVICE | Site: SHOULDER | Status: FUNCTIONAL

## 2025-01-23 DEVICE — IMPLANTABLE DEVICE: Type: IMPLANTABLE DEVICE | Site: SHOULDER | Status: FUNCTIONAL

## 2025-01-23 RX ORDER — DOCUSATE SODIUM 100 MG/1
100 CAPSULE, LIQUID FILLED ORAL 2 TIMES DAILY
Qty: 30 CAPSULE | Refills: 0 | Status: SHIPPED | OUTPATIENT
Start: 2025-01-23 | End: 2025-02-07

## 2025-01-23 RX ORDER — ONDANSETRON HYDROCHLORIDE 2 MG/ML
INJECTION, SOLUTION INTRAVENOUS AS NEEDED
Status: DISCONTINUED | OUTPATIENT
Start: 2025-01-23 | End: 2025-01-23

## 2025-01-23 RX ORDER — OXYCODONE AND ACETAMINOPHEN 5; 325 MG/1; MG/1
1 TABLET ORAL EVERY 6 HOURS PRN
Qty: 24 TABLET | Refills: 0 | Status: SHIPPED | OUTPATIENT
Start: 2025-01-23 | End: 2025-01-29

## 2025-01-23 RX ORDER — KETOROLAC TROMETHAMINE 10 MG/1
10 TABLET, FILM COATED ORAL EVERY 6 HOURS PRN
Qty: 12 TABLET | Refills: 0 | Status: SHIPPED | OUTPATIENT
Start: 2025-01-23 | End: 2025-01-23 | Stop reason: SDUPTHER

## 2025-01-23 RX ORDER — ROCURONIUM BROMIDE 10 MG/ML
INJECTION, SOLUTION INTRAVENOUS AS NEEDED
Status: DISCONTINUED | OUTPATIENT
Start: 2025-01-23 | End: 2025-01-23

## 2025-01-23 RX ORDER — POTASSIUM CHLORIDE 750 MG/1
10 CAPSULE, EXTENDED RELEASE ORAL 2 TIMES DAILY
COMMUNITY

## 2025-01-23 RX ORDER — FENTANYL CITRATE 50 UG/ML
50 INJECTION, SOLUTION INTRAMUSCULAR; INTRAVENOUS ONCE
Status: COMPLETED | OUTPATIENT
Start: 2025-01-23 | End: 2025-01-23

## 2025-01-23 RX ORDER — PHENYLEPHRINE HYDROCHLORIDE 10 MG/ML
INJECTION INTRAVENOUS AS NEEDED
Status: DISCONTINUED | OUTPATIENT
Start: 2025-01-23 | End: 2025-01-23

## 2025-01-23 RX ORDER — DOCUSATE SODIUM 100 MG/1
100 CAPSULE, LIQUID FILLED ORAL 2 TIMES DAILY
Qty: 30 CAPSULE | Refills: 0 | Status: SHIPPED | OUTPATIENT
Start: 2025-01-23 | End: 2025-01-23 | Stop reason: SDUPTHER

## 2025-01-23 RX ORDER — LIDOCAINE HYDROCHLORIDE 10 MG/ML
0.1 INJECTION, SOLUTION EPIDURAL; INFILTRATION; INTRACAUDAL; PERINEURAL ONCE
Status: DISCONTINUED | OUTPATIENT
Start: 2025-01-23 | End: 2025-01-23 | Stop reason: HOSPADM

## 2025-01-23 RX ORDER — MIDAZOLAM HYDROCHLORIDE 1 MG/ML
2 INJECTION, SOLUTION INTRAMUSCULAR; INTRAVENOUS ONCE
Status: COMPLETED | OUTPATIENT
Start: 2025-01-23 | End: 2025-01-23

## 2025-01-23 RX ORDER — SODIUM CHLORIDE, SODIUM LACTATE, POTASSIUM CHLORIDE, CALCIUM CHLORIDE 600; 310; 30; 20 MG/100ML; MG/100ML; MG/100ML; MG/100ML
100 INJECTION, SOLUTION INTRAVENOUS CONTINUOUS
Status: DISCONTINUED | OUTPATIENT
Start: 2025-01-23 | End: 2025-01-23 | Stop reason: HOSPADM

## 2025-01-23 RX ORDER — LIDOCAINE HYDROCHLORIDE 10 MG/ML
INJECTION, SOLUTION EPIDURAL; INFILTRATION; INTRACAUDAL; PERINEURAL AS NEEDED
Status: DISCONTINUED | OUTPATIENT
Start: 2025-01-23 | End: 2025-01-23

## 2025-01-23 RX ORDER — METOCLOPRAMIDE HYDROCHLORIDE 5 MG/ML
10 INJECTION INTRAMUSCULAR; INTRAVENOUS ONCE AS NEEDED
Status: DISCONTINUED | OUTPATIENT
Start: 2025-01-23 | End: 2025-01-23 | Stop reason: HOSPADM

## 2025-01-23 RX ORDER — HYDROMORPHONE HYDROCHLORIDE 0.2 MG/ML
0.2 INJECTION INTRAMUSCULAR; INTRAVENOUS; SUBCUTANEOUS EVERY 5 MIN PRN
Status: DISCONTINUED | OUTPATIENT
Start: 2025-01-23 | End: 2025-01-23 | Stop reason: HOSPADM

## 2025-01-23 RX ORDER — CEFAZOLIN SODIUM 2 G/100ML
2 INJECTION, SOLUTION INTRAVENOUS ONCE
Status: COMPLETED | OUTPATIENT
Start: 2025-01-23 | End: 2025-01-23

## 2025-01-23 RX ORDER — KETOROLAC TROMETHAMINE 30 MG/ML
INJECTION, SOLUTION INTRAMUSCULAR; INTRAVENOUS AS NEEDED
Status: DISCONTINUED | OUTPATIENT
Start: 2025-01-23 | End: 2025-01-23

## 2025-01-23 RX ORDER — DEXMEDETOMIDINE HYDROCHLORIDE 100 UG/ML
INJECTION, SOLUTION INTRAVENOUS AS NEEDED
Status: DISCONTINUED | OUTPATIENT
Start: 2025-01-23 | End: 2025-01-23

## 2025-01-23 RX ORDER — OXYCODONE AND ACETAMINOPHEN 5; 325 MG/1; MG/1
1 TABLET ORAL EVERY 6 HOURS PRN
Qty: 24 TABLET | Refills: 0 | Status: SHIPPED | OUTPATIENT
Start: 2025-01-23 | End: 2025-01-23 | Stop reason: SDUPTHER

## 2025-01-23 RX ORDER — OMEPRAZOLE 40 MG/1
40 CAPSULE, DELAYED RELEASE ORAL
Qty: 3 CAPSULE | Refills: 0 | Status: SHIPPED | OUTPATIENT
Start: 2025-01-23 | End: 2025-01-26

## 2025-01-23 RX ORDER — KETOROLAC TROMETHAMINE 10 MG/1
10 TABLET, FILM COATED ORAL EVERY 6 HOURS PRN
Qty: 12 TABLET | Refills: 0 | Status: SHIPPED | OUTPATIENT
Start: 2025-01-23 | End: 2025-01-26

## 2025-01-23 RX ORDER — SODIUM CHLORIDE, SODIUM LACTATE, POTASSIUM CHLORIDE, CALCIUM CHLORIDE 600; 310; 30; 20 MG/100ML; MG/100ML; MG/100ML; MG/100ML
50 INJECTION, SOLUTION INTRAVENOUS CONTINUOUS
Status: DISCONTINUED | OUTPATIENT
Start: 2025-01-23 | End: 2025-01-23 | Stop reason: HOSPADM

## 2025-01-23 RX ORDER — OMEPRAZOLE 40 MG/1
40 CAPSULE, DELAYED RELEASE ORAL
Qty: 3 CAPSULE | Refills: 0 | Status: SHIPPED | OUTPATIENT
Start: 2025-01-23 | End: 2025-01-23 | Stop reason: SDUPTHER

## 2025-01-23 RX ORDER — OXYCODONE HYDROCHLORIDE 5 MG/1
5 TABLET ORAL EVERY 4 HOURS PRN
Status: DISCONTINUED | OUTPATIENT
Start: 2025-01-23 | End: 2025-01-23 | Stop reason: HOSPADM

## 2025-01-23 RX ORDER — PROPOFOL 10 MG/ML
INJECTION, EMULSION INTRAVENOUS AS NEEDED
Status: DISCONTINUED | OUTPATIENT
Start: 2025-01-23 | End: 2025-01-23

## 2025-01-23 RX ORDER — ONDANSETRON HYDROCHLORIDE 2 MG/ML
4 INJECTION, SOLUTION INTRAVENOUS ONCE AS NEEDED
Status: DISCONTINUED | OUTPATIENT
Start: 2025-01-23 | End: 2025-01-23 | Stop reason: HOSPADM

## 2025-01-23 RX ADMIN — DEXMEDETOMIDINE HYDROCHLORIDE 8 MCG: 100 INJECTION, SOLUTION, CONCENTRATE INTRAVENOUS at 11:27

## 2025-01-23 RX ADMIN — PHENYLEPHRINE HYDROCHLORIDE 200 MCG: 10 INJECTION INTRAVENOUS at 10:53

## 2025-01-23 RX ADMIN — FENTANYL CITRATE 50 MCG: 50 INJECTION INTRAMUSCULAR; INTRAVENOUS at 09:48

## 2025-01-23 RX ADMIN — PHENYLEPHRINE HYDROCHLORIDE 200 MCG: 10 INJECTION INTRAVENOUS at 10:35

## 2025-01-23 RX ADMIN — ONDANSETRON 4 MG: 2 INJECTION, SOLUTION INTRAMUSCULAR; INTRAVENOUS at 10:10

## 2025-01-23 RX ADMIN — DEXMEDETOMIDINE HYDROCHLORIDE 8 MCG: 100 INJECTION, SOLUTION, CONCENTRATE INTRAVENOUS at 11:29

## 2025-01-23 RX ADMIN — MIDAZOLAM 2 MG: 1 INJECTION INTRAMUSCULAR; INTRAVENOUS at 09:48

## 2025-01-23 RX ADMIN — PHENYLEPHRINE HYDROCHLORIDE 200 MCG: 10 INJECTION INTRAVENOUS at 10:44

## 2025-01-23 RX ADMIN — SODIUM CHLORIDE, POTASSIUM CHLORIDE, SODIUM LACTATE AND CALCIUM CHLORIDE: 600; 310; 30; 20 INJECTION, SOLUTION INTRAVENOUS at 10:04

## 2025-01-23 RX ADMIN — KETOROLAC TROMETHAMINE 15 MG: 30 INJECTION, SOLUTION INTRAMUSCULAR at 11:26

## 2025-01-23 RX ADMIN — PHENYLEPHRINE HYDROCHLORIDE 100 MCG: 10 INJECTION INTRAVENOUS at 10:26

## 2025-01-23 RX ADMIN — PROPOFOL 50 MG: 10 INJECTION, EMULSION INTRAVENOUS at 11:26

## 2025-01-23 RX ADMIN — ROCURONIUM BROMIDE 50 MG: 10 INJECTION, SOLUTION INTRAVENOUS at 10:10

## 2025-01-23 RX ADMIN — DEXAMETHASONE SODIUM PHOSPHATE 4 MG: 4 INJECTION, SOLUTION INTRAMUSCULAR; INTRAVENOUS at 10:16

## 2025-01-23 RX ADMIN — PHENYLEPHRINE HYDROCHLORIDE 200 MCG: 10 INJECTION INTRAVENOUS at 10:31

## 2025-01-23 RX ADMIN — PROPOFOL 150 MG: 10 INJECTION, EMULSION INTRAVENOUS at 10:10

## 2025-01-23 RX ADMIN — PHENYLEPHRINE HYDROCHLORIDE 200 MCG: 10 INJECTION INTRAVENOUS at 10:27

## 2025-01-23 RX ADMIN — PHENYLEPHRINE HYDROCHLORIDE 200 MCG: 10 INJECTION INTRAVENOUS at 10:39

## 2025-01-23 RX ADMIN — DEXMEDETOMIDINE HYDROCHLORIDE 8 MCG: 100 INJECTION, SOLUTION, CONCENTRATE INTRAVENOUS at 11:25

## 2025-01-23 RX ADMIN — LIDOCAINE HYDROCHLORIDE 5 ML: 10 INJECTION, SOLUTION EPIDURAL; INFILTRATION; INTRACAUDAL; PERINEURAL at 10:10

## 2025-01-23 RX ADMIN — CEFAZOLIN SODIUM 2 G: 2 INJECTION, SOLUTION INTRAVENOUS at 10:18

## 2025-01-23 RX ADMIN — SUGAMMADEX 200 MG: 100 INJECTION, SOLUTION INTRAVENOUS at 11:29

## 2025-01-23 SDOH — HEALTH STABILITY: MENTAL HEALTH: CURRENT SMOKER: 1

## 2025-01-23 ASSESSMENT — PAIN SCALES - GENERAL
PAINLEVEL_OUTOF10: 0 - NO PAIN
PAINLEVEL_OUTOF10: 0 - NO PAIN
PAINLEVEL_OUTOF10: 2
PAINLEVEL_OUTOF10: 4
PAINLEVEL_OUTOF10: 0 - NO PAIN
PAINLEVEL_OUTOF10: 6
PAINLEVEL_OUTOF10: 0 - NO PAIN
PAIN_LEVEL: 0
PAINLEVEL_OUTOF10: 0 - NO PAIN

## 2025-01-23 ASSESSMENT — PAIN - FUNCTIONAL ASSESSMENT
PAIN_FUNCTIONAL_ASSESSMENT: 0-10
PAIN_FUNCTIONAL_ASSESSMENT: WONG-BAKER FACES
PAIN_FUNCTIONAL_ASSESSMENT: 0-10

## 2025-01-23 ASSESSMENT — COLUMBIA-SUICIDE SEVERITY RATING SCALE - C-SSRS
1. IN THE PAST MONTH, HAVE YOU WISHED YOU WERE DEAD OR WISHED YOU COULD GO TO SLEEP AND NOT WAKE UP?: NO
2. HAVE YOU ACTUALLY HAD ANY THOUGHTS OF KILLING YOURSELF?: NO

## 2025-01-23 NOTE — BRIEF OP NOTE
Date: 2025  OR Location: [facility]    Name: Lulu Celaya, : 1960, Age: 64 y.o., MRN: [id number], Sex: female    Diagnosis  Pre-op Diagnosis      * Traumatic complete tear of left rotator cuff, initial encounter [S46.012A] Post-op Diagnosis     * Traumatic complete tear of left rotator cuff, initial encounter [S46.012A]     Procedures  Left Shoulder Arthroscopic Rotator Cuff Repair with Collagen Implant, Decompression, Debridement  (ARTHREX ANCHORS, AVEUMED ANCHORS, REGENETEN, DERMIS ON DEMAND,ARTHREX CAMERA & PUMP, WAGONER/NEPHEW WEREWOLF AND SHAVER)  23993 - VT SURGICAL ARTHROSCOPY SHOULDER W/ROTATOR CUFF RPR    VT SURGICAL ARTHROSCOPY SHOULDER XTNSV DBRDMT 3+ [33770]  VT TENODESIS LONG TENDON BICEPS [73312]  Surgeons      * Gaurang Meyer - Primary    Resident/Fellow/Other Assistant:  Surgeons and Role:     * JOS Webb-CNP - LOLITA First Assist    Staff:   Circulator: Yaz  Scrub Person: Demetria Chowdhury Scrub: Lisa Chowdhury Circulator: Becca    Anesthesia Staff: Anesthesiologist: Bc Welch MD  CRNA: AN OsborneCRNA    Procedure Summary  Anesthesia: General  ASA: III  Estimated Blood Loss: minimal   Intra-op Medications:   Administrations occurring from 0930 to 1155 on 25:   Medication Name Total Dose   EPINEPHrine (Adrenalin) 1 mg/mL 1 mg in sodium chloride 0.9 % 3,000 mL irrigation 3 mL   dexAMETHasone (Decadron) injection 4 mg/mL 4 mg   dexmedeTOMIDine (Precedex) 100 mcg/mL 2 mL single dose vial 24 mcg   ketorolac (Toradol) injection 30 mg 15 mg   lactated Ringer's infusion Cannot be calculated   lidocaine PF (Xylocaine-MPF) local injection 1 % 5 mL   ondansetron (Zofran) 2 mg/mL injection 4 mg   phenylephrine (Jones-Synephrine) injection 1,300 mcg   propofol (Diprivan) injection 10 mg/mL 200 mg   rocuronium (ZeMuron) 50 mg/5 mL injection 50 mg   sugammadex (Bridion) 200 mg/2 mL injection 200 mg   ceFAZolin (Ancef) 2 g in dextrose (iso)  mL 2  g   fentaNYL PF (Sublimaze) injection 50 mcg 50 mcg   midazolam (Versed) injection 2 mg 2 mg              Anesthesia Record               Intraprocedure I/O Totals          Intake    lactated Ringer's infusion 400.00 mL    Total Intake 400 mL          Specimen: No specimens collected     Findings: Consistent with preop diagnosis    Complications:  None; patient tolerated the procedure well.     Disposition: PACU - hemodynamically stable.  Condition: stable  Specimens Collected: No specimens collected  Attending Attestation: I was present and scrubbed for the entire procedure.    Gaurang Meyer  Phone Number: [phone]

## 2025-01-23 NOTE — DISCHARGE INSTRUCTIONS
Follow-Up Instructions  You will need to be seen in clinic by Dr. Meyer or his NP Ella Russell in 2 weeks for a post-operative evaluation.  This appointment will be in the outpatient surgical specialty services [place], on the [facility].    You will need to call and schedule an appointment, unless there is a previous appointment that appears on your discharge instructions.  The direct orthopaedic clinic appointment line phone number is [phone].  Please do not delay in calling to make this appointment.    You should also follow up with your primary care provider in 1-2 weeks.    Activity Restrictions  1) No driving until further instructed by your orthopaedic physician, which will be addressed at your outpatient appointments.    2) No driving or operating heavy machinery while taking narcotic pain medication.    3) Weight bearing status --> Non-weight bearing operative extremity; keep the sling but ok to come out periodically to work on elbow and wrist range of motion.     Discharge Medications  You have been sent home with the following home medications: Percocet, ketorolac, prilosec, colace.  Please wean yourself off the percocet, as tolerated. A good time to take the medication is before physical therapy sessions and bedtime. Colace is a stool softener to reduce the narcotic pain medications cause. Take it twice a day while taking narcotic pain medication to ensure you maintain your regular bowel movement frequency.    Do not take tylenol while taking percocet    POTENTIAL MEDICATION SIDE EFFECTS.  PERCOCET: constipation, nausea, vomiting, upset stomach, (sleepiness), dizziness, lightheadedness, itching, headache, blurred vision, dry mouth, sweating  KETOROLAC: upset stomach, kidney issues.    Wound care instructions:   1) Leave operative dressing in place until postoperative day 7.  Then remove and leave incision open to air. Let water run freely over incision when showering, do  not scrub. Do not soak in pool or tub.    2) Call if any drainage after 7 days, increased redness/warmth/swelling at incision site, abnormal pain/tenderness of the extremity, abnormal swelling of the extremity that does not respond to elevation, SOB/chest pain. Do not swim in pools or ponds until 3 months after surgery.

## 2025-01-23 NOTE — OP NOTE
Left Shoulder Arthroscopic Rotator Cuff Repair with Collagen Implant, Decompression, Debridement  (ARTHREX ANCHORS, AVEUMED ANCHORS, REGENETEN, DERMIS ON DEMAND,ARTHREX CAMERA & PUMP, WAGONER/NEPHEW WEREWOLF AND SHAVER) (L) Operative Note     Date: 2025  OR Location: [facility]    Name: Lulu Celaya, : 1960, Age: 64 y.o., MRN: [id number], Sex: female    Diagnosis  Pre-op Diagnosis      * Traumatic complete tear of left rotator cuff, initial encounter [S46.012A] Post-op Diagnosis     * Traumatic complete tear of left rotator cuff, initial encounter [S46.012A]     Procedures  Left Shoulder Arthroscopic Rotator Cuff Repair with Collagen Implant, Decompression, Debridement  (ARTHREX ANCHORS, AVEUMED ANCHORS, REGENETEN, DERMIS ON DEMAND,ARTHREX CAMERA & PUMP, WAGONER/NEPHEW WEREWOLF AND SHAVER)  90237 - FL SURGICAL ARTHROSCOPY SHOULDER W/ROTATOR CUFF RPR    FL SURGICAL ARTHROSCOPY SHOULDER XTNSV DBRDMT 3+ [63044]  FL TENODESIS LONG TENDON BICEPS [47241]  Surgeons      * Gaurang Meyer - Primary    Resident/Fellow/Other Assistant:  Surgeons and Role:     * JOS Webb-CNP - LOLITA First Assist    Staff:   Circulator: Yaz Hadleyub Person: Demetria Chowdhury Scrub: Lisa Chowdhury Circulator: Becca    Anesthesia Staff: Anesthesiologist: Bc Welch MD  CRNA: JOS Osborne-CRNA    Procedure Summary  Anesthesia: General  ASA: III  Estimated Blood Loss: 20mL  Intra-op Medications:   Administrations occurring from 0930 to 1155 on 25:   Medication Name Total Dose   EPINEPHrine (Adrenalin) 1 mg/mL 1 mg in sodium chloride 0.9 % 3,000 mL irrigation 3 mL   dexAMETHasone (Decadron) injection 4 mg/mL 4 mg   dexmedeTOMIDine (Precedex) 100 mcg/mL 2 mL single dose vial 24 mcg   ketorolac (Toradol) injection 30 mg 15 mg   lactated Ringer's infusion Cannot be calculated   lidocaine PF (Xylocaine-MPF) local injection 1 % 5 mL   ondansetron (Zofran) 2 mg/mL injection 4 mg   phenylephrine  (Jones-Synephrine) injection 1,300 mcg   propofol (Diprivan) injection 10 mg/mL 200 mg   rocuronium (ZeMuron) 50 mg/5 mL injection 50 mg   sugammadex (Bridion) 200 mg/2 mL injection 200 mg   ceFAZolin (Ancef) 2 g in dextrose (iso)  mL 2 g   fentaNYL PF (Sublimaze) injection 50 mcg 50 mcg   midazolam (Versed) injection 2 mg 2 mg              Anesthesia Record               Intraprocedure I/O Totals          Intake    lactated Ringer's infusion 400.00 mL    Total Intake 400 mL          Specimen: No specimens collected              Drains and/or Catheters: * None in log *    Tourniquet Times:         Implants:  Implants       Type Name Action Serial No.      Implant SUTURE, ANCHOR, 2.8MM, Q-FIX, ALL - [id number] Implanted      Implant SUTURE, ANCHOR, 2.8MM, Q-FIX, ALL - [id number] Implanted      Implant ANCHOR, PHANTOM-L PEEK, 5.5 X 19MM, W/ 1 P2 SUTURE - [id number] Implanted               Findings: Consistent with diagnosis patient had grade 4 arthritic changes on the humerus and glenoid labral degenerative tearing and tearing massive tear of the supra and infraspinatus retracted to the level of the glenohumeral joint anterior posterior rotator cuff are intact prior tear of the long head of the biceps    Indications: Lulu Celaya is an 64 y.o. female who is having surgery for Traumatic complete tear of left rotator cuff, initial encounter [S46.012A]. M19.019; labral degenerative tearing of the superior labrum Long head of the biceps rupture    The patient was seen in the preoperative area. The risks, benefits, complications, treatment options, non-operative alternatives, expected recovery and outcomes were discussed with the patient. The possibilities of reaction to medication, pulmonary aspiration, injury to surrounding structures, bleeding, recurrent infection, the need for additional procedures, failure to diagnose a condition, and creating a complication requiring transfusion or operation were  discussed with the patient. The patient concurred with the proposed plan, giving informed consent.  The site of surgery was properly noted/marked if necessary per policy. The patient has been actively warmed in preoperative area. Preoperative antibiotics have been ordered and given within 1 hours of incision. Venous thrombosis prophylaxis have been ordered including bilateral sequential compression devices    Procedure Details: FINDINGS:    Consistent with diagnosis.  The patient had a full-thickness rotator  cuff tear; significant labral degenerative tearing of the anterior,  superior, and posterior labrum. Anterior and posterior rotator cuff were intact.  No signs  of any significant arthritis.     BRIEF HISTORY:    This is a pleasant person, who was found to have a rotator cuff tear.  failed conservative management including injections, therapy, and  anti-inflammatories.  MRI revealed a full-thickness rotator cuff tear  with minimal fatty degeneration.  Risks, benefits, and alternatives  were discussed including risk of it not healing.  They understood,  and they wished to proceed.     OPERATIVE REPORT:    On the day of surgery, they were seen in preoperative holding area,  identified as correct patient.  The shoulder was identified and  marked as the correct operative site.  Risks, benefits, and  alternatives of surgery were discussed again.  Shoulder was marked.   Interscalene nerve block was performed and taken to the operating  room on a standard [facility].  A huddle was ensued ensuring the  correct patient and the correct shoulder had been marked.   Antibiotics were dosed.  All were in agreement.  Sedated and  intubated.  Positioned in a beach chair position 90 degree.  All bony  prominences were well padded.  We prepped and draped in standard  fashion and paused in standard fashion.  We then made our standard  posterior portal in the glenohumeral joint.  Anterior medial portal  was made; and extensive  debridement of the anterior, superior, and  posterior labrum, undersurface of the rotator cuff and articular cartilage was performed.  We also extensively debrided the stump of the biceps tendon the prior rupture.  We released the rotator interval.  TWe turned our attention back to the  subacromial space and made a lateral portal.  We did extensive  decompression of the bursitis as well as the type 2 acromial spur.   This was used with a shaver.  A shaver was used to prepare the greater  tuberosity.  Two anchors were placed medially.  Inverted  mattresses were placed.  Placed in marginal convergence stitch as well.  These were brought out in a transosseous  fashion to the lateral anchors.  This nicely compressed the rotator  cuff across the entire greater tuberosity.  We copiously irrigated  the wound, closed with nylon suture, awoke the patient from  anesthesia, and transferred to PACU where they were recovering.  I was  present and scrubbed for all critical portions of the procedure.  Please note that we will be billing for my nurse practitioner's first assist as she was critical in the center for successful completion of the case including positioning of the body, retraction, suture management, placement of the implants and wound closure. There was no qualified resident available for the case  Complications:  None; patient tolerated the procedure well.    Disposition: PACU - hemodynamically stable.  Condition: stable             Task Performed by LOLITA First Assist or Physician Assistant:   Drew COATES/REMY, was necessary to assist on this case due to the nature of the case and difficulty. During the case she served as my assist by patient positioning portal placement repair of the rotator cuff and wound closure      Additional Details: None    Attending Attestation: I was present and scrubbed for the key portions of the procedure.    Gaurang Meyer  Phone Number: [phone]

## 2025-01-23 NOTE — PERIOPERATIVE NURSING NOTE
Pt cont to deny pain, follows instructions for deep breaths while appearing unhappy.  Pt tolerating PO.

## 2025-01-23 NOTE — ANESTHESIA PROCEDURE NOTES
Airway  Date/Time: 1/23/2025 10:13 AM  Urgency: elective    Airway not difficult    Staffing  Performed: CRNA   Authorized by: Bc Welch MD    Performed by: JOS Osborne-CRNA  Patient location during procedure: OR    Indications and Patient Condition  Indications for airway management: anesthesia  Sedation level: deep  Preoxygenated: yes  Patient position: sniffing  MILS maintained throughout  Mask difficulty assessment: 1 - vent by mask    Final Airway Details  Final airway type: endotracheal airway      Successful airway: ETT  Cuffed: yes   Successful intubation technique: direct laryngoscopy  Facilitating devices/methods: intubating stylet  Endotracheal tube insertion site: oral  Blade: Mayers  Blade size: #2  ETT size (mm): 7.0  Cormack-Lehane Classification: grade I - full view of glottis  Placement verified by: chest auscultation and capnometry   Measured from: lips  ETT to lips (cm): 22  Number of attempts at approach: 1

## 2025-01-23 NOTE — ANESTHESIA PREPROCEDURE EVALUATION
Patient: Lulu Celaya    Procedure Information       Date/Time: 01/23/25 0930    Procedure: Left Shoulder Arthroscopic Rotator Cuff Repair with Collagen Implant, Decompression, Debridement and Biceps Tenodesis (ARTHREX ANCHORS, AVEUMED ANCHORS, REGENETEN, DERMIS ON DEMAND,ARTHREX CAMERA & PUMP, WAGONER/NEPHEW WEREWOLF AND SHAVER) (Left: Shoulder)    Location: [facility] / [facility]    Surgeons: Gaurang Meyer MD            Relevant Problems   Anesthesia (within normal limits)      Cardiac (within normal limits)      Pulmonary (within normal limits)      Neuro (within normal limits)      GI (within normal limits)      /Renal (within normal limits)      Liver (within normal limits)      Endocrine (within normal limits)      Hematology (within normal limits)      Musculoskeletal (within normal limits)      HEENT (within normal limits)      ID (within normal limits)      Skin (within normal limits)      GYN (within normal limits)       Clinical information reviewed:   Tobacco  Allergies  Meds   Med Hx  Surg Hx  OB Status  Fam Hx  Soc   Hx        NPO Detail:  NPO/Void Status  Date of Last Liquid: 01/22/25  Time of Last Liquid: 2215  Date of Last Solid: 01/22/25  Time of Last Solid: 2215  Last Intake Type: Clear fluids  Time of Last Void: 0600         Physical Exam    Airway  Mallampati: II  TM distance: >3 FB  Neck ROM: full     Cardiovascular - normal exam     Dental   Comments: Edentulous     Pulmonary - normal exam     Abdominal - normal exam             Anesthesia Plan    History of general anesthesia?: yes  History of complications of general anesthesia?: no    ASA 3     general     The patient is a current smoker.  Patient was previously instructed to abstain from smoking on day of procedure.  Patient did not smoke on day of procedure.    intravenous induction   Anesthetic plan and risks discussed with patient.  Use of blood products discussed with patient who consented to blood products.

## 2025-01-23 NOTE — ANESTHESIA POSTPROCEDURE EVALUATION
Patient: Lulu Celaya    Procedure Summary       Date: 01/23/25 Room / Location: [facility] / [facility]    Anesthesia Start: 1004 Anesthesia Stop: 1145    Procedure: Left Shoulder Arthroscopic Rotator Cuff Repair with Collagen Implant, Decompression, Debridement  (ARTHREX ANCHORS, AVEUMED ANCHORS, REGENETEN, DERMIS ON DEMAND,ARTHREX CAMERA & PUMP, WAGONER/NEPHEW WEREWOLF AND SHAVER) (Left: Shoulder) Diagnosis:       Traumatic complete tear of left rotator cuff, initial encounter      (Traumatic complete tear of left rotator cuff, initial encounter [S46.012A])    Surgeons: Gaurang Meyer MD Responsible Provider: Bc Welch MD    Anesthesia Type: general ASA Status: 3            Anesthesia Type: general    Vitals Value Taken Time   /73 01/23/25 1215   Temp 36.2 °C (97.2 °F) 01/23/25 1215   Pulse 66 01/23/25 1215   Resp 16 01/23/25 1215   SpO2 95 % 01/23/25 1215       Anesthesia Post Evaluation    Patient location during evaluation: bedside  Patient participation: complete - patient participated  Level of consciousness: awake and alert  Pain score: 0  Pain management: adequate  Airway patency: patent  Cardiovascular status: acceptable  Respiratory status: acceptable  Hydration status: acceptable  Postoperative Nausea and Vomiting: none        No notable events documented.

## 2025-01-23 NOTE — ANESTHESIA PROCEDURE NOTES
Peripheral Block    Patient location during procedure: holding area  Start time: 1/23/2025 9:52 AM  End time: 1/23/2025 9:56 AM  Reason for block: at surgeon's request and post-op pain management  Staffing  Performed: attending and KATH   Authorized by: Omar Regalado MD    Performed by: Omar Regalado MD  Preanesthetic Checklist  Completed: patient identified, IV checked, site marked, risks and benefits discussed, surgical consent, monitors and equipment checked, pre-op evaluation and timeout performed   Timeout performed at: 1/23/2025 9:48 AM  Peripheral Block  Patient position: sitting  Prep: ChloraPrep  Patient monitoring: heart rate, cardiac monitor and continuous pulse ox  Block type: interscalene  Laterality: left  Injection technique: single-shot  Guidance: ultrasound guided  Local infiltration: ropivacaine  Infiltration strength: 0.5 %  Dose: 20 mL  Needle  Needle gauge: 20 G  Needle length: 5 cm  Needle localization: ultrasound guidance  Assessment  Injection assessment: negative aspiration for heme, no paresthesia on injection, incremental injection and local visualized surrounding nerve on ultrasound  Heart rate change: no  Slow fractionated injection: no

## 2025-01-24 ENCOUNTER — TELEPHONE (OUTPATIENT)
Dept: ORTHOPEDIC SURGERY | Facility: HOSPITAL | Age: 65
End: 2025-01-24
Payer: COMMERCIAL

## 2025-01-24 DIAGNOSIS — Z98.890 STATUS POST ARTHROSCOPY OF LEFT SHOULDER: Primary | ICD-10-CM

## 2025-01-24 ASSESSMENT — PAIN SCALES - GENERAL: PAINLEVEL_OUTOF10: 7

## 2025-01-24 NOTE — TELEPHONE ENCOUNTER
Copied from CRM #[id number]. Topic: Information Request - Doctor, Hospital, or Provider  >> Jan 24, 2025 10:01 AM Mariella MARKS wrote:  Patient has a question about taking blood thinners, and what other medication can she take for pain inbetween taking prescribed pain medication. Please call to assist.

## 2025-01-31 DIAGNOSIS — G89.18 ACUTE POST-OPERATIVE PAIN: Primary | ICD-10-CM

## 2025-01-31 RX ORDER — OXYCODONE AND ACETAMINOPHEN 5; 325 MG/1; MG/1
1 TABLET ORAL EVERY 6 HOURS PRN
Qty: 24 TABLET | Refills: 0 | Status: SHIPPED | OUTPATIENT
Start: 2025-01-31 | End: 2025-02-06

## 2025-02-04 ENCOUNTER — OFFICE VISIT (OUTPATIENT)
Dept: ORTHOPEDIC SURGERY | Facility: HOSPITAL | Age: 65
End: 2025-02-04
Payer: COMMERCIAL

## 2025-02-04 VITALS — BODY MASS INDEX: 35.08 KG/M2 | HEIGHT: 63 IN | WEIGHT: 198 LBS

## 2025-02-04 DIAGNOSIS — S46.012A TRAUMATIC COMPLETE TEAR OF LEFT ROTATOR CUFF, INITIAL ENCOUNTER: Primary | ICD-10-CM

## 2025-02-04 PROCEDURE — 99211 OFF/OP EST MAY X REQ PHY/QHP: CPT | Performed by: NURSE PRACTITIONER

## 2025-02-04 ASSESSMENT — PAIN SCALES - GENERAL: PAINLEVEL_OUTOF10: 7

## 2025-02-04 ASSESSMENT — PAIN - FUNCTIONAL ASSESSMENT: PAIN_FUNCTIONAL_ASSESSMENT: 0-10

## 2025-02-04 NOTE — PROGRESS NOTES
Provider Impression/Assessment:  Lulu Celaya is 12 days Left Shoulder Arthroscopic Rotator Cuff Repair, Decompression, Debridement, done 1/23/25. Biceps spontaneously ruptured prior to surgery.     Patient Discussion/Plan:  For the next 2 weeks, they are to do elbow and wrist range of motion exercises that have been discussed with them. These should be done 5-6 times a day. We talked about scapular stabilizing exercises that they can do right now as well. No lifting more than a coffee cup for the next 6 weeks. No torquing motions. No opening or closing doors. It is highly encouraged that the sling should be worn the majority of time over the next 2 weeks, while in and out of the house. It should be worn with sleeping as well. In 2 weeks time, they will initiate physical therapy for range of motion exercises and discard the sling. A prescription for therapy was given today as well as [facility] locations to get therapy completed. We will likely allow them to progress to strengthening at 3 months.     We discussed compliance and not overdoing it. Patient should be seen again in clinic in 6 weeks for a repeat clinical check with Dr Meyer. No repeat xrays needed at that time.     All questions were answered today and they are comfortable with the above plan. Patient was discussed with Dr Meyer and he agrees with the above plan.      Should you have any questions or concerns after your visit today, please do not hesitate to call the office at [phone]. We strive to give you high-quality, patient-centered, collaborative care and I am honored to be a part of your health care team.      -------------------------------------------------------------------------------------------------  CHIEF COMPLAINT:  POV: LEFT RCR  DOS: 1/23/25  [facility]    History of Present Illness:  Lulu Celaya is a pleasant 64 y.o. female who returns to clinic for their first postoperative visit. They are 12 days S/P Left Shoulder  Arthroscopic Rotator Cuff Repair, Decompression, Debridement, done 1/23/25. Sutures removed today. Workers Compensation injury    Lulu Celaya reports doing well after surgery. Pain is well managed. She continues to take narcotic pain medication at this time as needed, alternating with Over-the-counter pain medication at this time. Using ice machine daily. Sleeping without much difficulty. Denies redness or warmth at incision site. Denies any fever, chills, or paresthesia. They have been compliant with restrictions. Presents today wearing their shoulder sling. Doing well with daily home therapy for passive shoulder exercises and active elbow, wrist and hand exercises.     There is some intermittent pain that waxes and wanes between moderate and mild severity. They are requesting additional pain medication today.   Discussion today about limitations with returning to driving a vehicle. They are able to drive as long as they are no longer taking any narcotic pain medication and do not use the operative arm for driving.     Review of Systems:   Review of systems for all 14 systems is negative for complaint except for the above noted findings in the history of present illness.    Family, social, and medical histories are obtained and reviewed.    Allergies:  No Known Allergies    Medications:    Current Outpatient Medications:     aspirin 81 mg EC tablet, Take 1 tablet (81 mg) by mouth once daily as needed for mild pain (1 - 3)., Disp: , Rfl:     atorvastatin (Lipitor) 20 mg tablet, Take 1 tablet (20 mg) by mouth once daily., Disp: , Rfl:     chlorhexidine (Peridex) 0.12 % solution, Take 15 mL by mouth twice a day. (Patient not taking: Reported on 1/23/2025), Disp: , Rfl:     chlorthalidone (Hygroton) 25 mg tablet, Take 1 tablet (25 mg) by mouth once daily., Disp: , Rfl:     lisinopril 10 mg tablet, Take 1 tablet (10 mg) by mouth once daily., Disp: , Rfl:     omeprazole (PriLOSEC) 40 mg DR capsule, Take 1  capsule (40 mg) by mouth once daily in the morning. Take before meals for 3 days. Do not crush or chew., Disp: 3 capsule, Rfl: 0    oxyCODONE-acetaminophen (Percocet) 5-325 mg tablet, Take 1 tablet by mouth every 6 hours if needed for severe pain (7 - 10) for up to 6 days., Disp: 24 tablet, Rfl: 0    potassium chloride ER (Micro-K) 10 mEq ER capsule, Take 1 capsule (10 mEq) by mouth 2 times a day. Do not crush or chew. Patient unsure of dose, taking times 2 weeks, Disp: , Rfl:     Diagnoses/Problems:  Left rotator cuff tear    Physical Examination:  Patient portal shoulder incisions are dry, intact and healing well. Minimal swelling. No warmth or erythema. No ecchymosis. Sutures were removed today and steris strips placed on incision sites on top of shoulder. Neurovascularly intact distally. 5 out of 5 wrist, hand and thumb flexion and extension without pain. Full active range of motion of elbow    Results/Imaging:  I personally spent time viewing digital images today with the patient that were taken intraoperatively.     *While intending to generate a timely document that accurately reflects the content of the visit, no guarantee can be provided that every grammatical or spelling mistake has been or will be identified or corrected. Thank you for your understanding.

## 2025-02-11 DIAGNOSIS — G89.18 ACUTE POST-OPERATIVE PAIN: ICD-10-CM

## 2025-02-11 RX ORDER — OXYCODONE AND ACETAMINOPHEN 5; 325 MG/1; MG/1
1 TABLET ORAL EVERY 6 HOURS PRN
Qty: 24 TABLET | Refills: 0 | Status: SHIPPED | OUTPATIENT
Start: 2025-02-11 | End: 2025-02-17

## 2025-02-11 NOTE — PROGRESS NOTES
Patient is status post major orthopaedic shoulder surgery. They called the office today with continued surgical pain. They are having 7 out of 10 pain and are still in the postoperative period. We prescribed Percocet for their pain. They are to use it sparingly, alternating with extra strength Tylenol. I have personally obtained and reviewed the [facility] report on this patient. This report is part of the electronic medical record which will be scanned to the chart. I have considered the risks of abuse, dependence, addiction and diversion. I believe that it is clinically appropriate for this patient to be prescribed this medication at this time due to current post operative symptoms and documented diagnosis.   English

## 2025-02-20 ENCOUNTER — DOCUMENTATION (OUTPATIENT)
Dept: ORTHOPEDIC SURGERY | Facility: HOSPITAL | Age: 65
End: 2025-02-20
Payer: COMMERCIAL

## 2025-02-20 DIAGNOSIS — S46.012A TRAUMATIC COMPLETE TEAR OF LEFT ROTATOR CUFF, INITIAL ENCOUNTER: Primary | ICD-10-CM

## 2025-02-20 RX ORDER — TRAMADOL HYDROCHLORIDE 50 MG/1
50 TABLET ORAL EVERY 4 HOURS PRN
Qty: 36 TABLET | Refills: 0 | Status: SHIPPED | OUTPATIENT
Start: 2025-02-20 | End: 2025-02-26

## 2025-03-03 DIAGNOSIS — M25.512 LEFT SHOULDER PAIN, UNSPECIFIED CHRONICITY: Primary | ICD-10-CM

## 2025-03-03 RX ORDER — TRAMADOL HYDROCHLORIDE 50 MG/1
50 TABLET ORAL EVERY 4 HOURS PRN
Qty: 36 TABLET | Refills: 0 | Status: SHIPPED | OUTPATIENT
Start: 2025-03-03 | End: 2025-03-09

## 2025-03-03 NOTE — PROGRESS NOTES
Patient is status post orthopaedic shoulder surgery. They called the office today with continued surgical pain. They are having 7 out of 10 pain and are still in the postoperative period. We prescribed Tramadol for their pain. They are to use it sparingly, alternating with extra strength Tylenol. I have personally obtained and reviewed the OARRS report on this patient. This report is part of the electronic medical record which will be scanned to the chart. I have considered the risks of abuse, dependence, addiction and diversion. I believe that it is clinically appropriate for this patient to be prescribed this medication at this time due to current post operative symptoms and documented diagnosis.

## 2025-03-15 NOTE — PROGRESS NOTES
Subjective   Patient ID: Lulu Celaya is a 64 y.o. female    Chief Complaint: No chief complaint on file.       Last Surgery: Left Shoulder Arthroscopic Rotator Cuff Repair with Collagen Implant, Decompression, Debridement  (ARTHREX ANCHORS, AVEUMED ANCHORS, REGENETEN, DERMIS ON DEMAND,ARTHREX CAMERA & PUMP, WAGONER/NEPHEW WEREWOLF AND SHAVER) - Left  Date of Last Surgery: 1/23/2025    HPI  Lulu Celaya is a 64 y.o. right hand dominant female presenting for left shoulder pain. This is a second opinion visit.     She is here today with complaints of left shoulder pain. She explains she is a  and hurt herself originally lifting heavy containers of food. She was seen by [facility] with Dr. Porras who had her begin therapy. While attending therapy she began to experience swelling in her neck and had to stop. She then followed up with Dr. Osullivan who informed her that she needed a shoulder replacement. Her right side is beginning to hurt due to over compensation    3/15/25  Lulu Celaya is a 64 y.o. female returning to the clinic for a second post operative visit regarding her left shoulder. She is s/p Left Shoulder Arthroscopic Rotator Cuff Repair, Decompression, Debridement, done 1/23/25. Biceps spontaneously ruptured prior to surgery.      She explains she has been feeling a new pain since she has started therapy.     Objective   Patient is a well-developed, well-nourished female  in no acute distress.  Breathes with normal chest rises.  Pupils are round and symmetric today.  Awake, alert, and oriented x3.      Examination of the left shoulder today reveals well healed surgical incisions. There is no sign of any atrophy, lesions, or abrasions. There is no pain to palpation of the bony prominences. Cervical lymphadenopathy examined, and this was negative. Patient had 5 out of 5 wrist flexion, extension, and thumb extension bilaterally. Sensation was intact to light touch to median,  ulnar, radial axillary, and musculocutaneous nerves bilaterally. Positive radial pulse bilaterally. Provocative maneuvers on the left side today were negative.  Range of motion of the left shoulder revealed 0-90° of forward elevation, 0-60° of external rotation, and internal rotation was to T-12.     Examination of the right shoulder today reveals the skin to be intact. There is no sign of any atrophy, lesions, or abrasions. There is no pain to palpation of the bony prominences. Cervical lymphadenopathy examined, and this was negative. Patient had 5 out of 5 wrist flexion, extension, and thumb extension, bilaterally. Sensation was intact to light touch to median, ulnar, radial, axillary, and musculocutaneous nerves bilaterally. Positive radial pulse bilaterally. Provocative maneuvers on the right side today were negative.  Range of motion of the right shoulder revealed 0-170° of forward elevation, 0-60° of external rotation, and internal rotation up to T-12.    Imaging:  MRI of the left shoulder taken personally interpreted by me show the anterior and posterior rotator cuffs are intact. Superior rotator cuff tear retracted to the level of the glenoid. Grade 1 changes to the supraspinatus and infraspinatus    Assessment/Plan   No diagnosis found.  Patient s/p Left Shoulder Arthroscopic Rotator Cuff Repair, Decompression, Debridement, done 1/23/25. Biceps spontaneously ruptured prior to surgery.       She is doing well. She is going to continue with range of motion in physical therapy. She has no restrictions to where her arm is in space but she should continue to lift no more than a coffee cup at this time. We will see her back in 1 month for a repeat clinical exam and progression to strengthening.     I personally have reviewed the OARRS report for this patient. This report is scanned into the electronic medical record. I have considered the risks of abuse, dependence, addiction and diversion. I believe that it is  clinically appropriate for the patient to be prescribed this medication.     No orders of the defined types were placed in this encounter.        Follow up in 1 month     Scribe Attestation  By signing my name below, I, Misha Irwin   attest that this documentation has been prepared under the direction and in the presence of Gaurang Meyer MD.

## 2025-03-17 ENCOUNTER — APPOINTMENT (OUTPATIENT)
Dept: ORTHOPEDIC SURGERY | Facility: CLINIC | Age: 65
End: 2025-03-17
Payer: COMMERCIAL

## 2025-03-17 DIAGNOSIS — Z98.890 STATUS POST ARTHROSCOPY OF LEFT SHOULDER: Primary | ICD-10-CM

## 2025-03-17 DIAGNOSIS — S46.012A TRAUMATIC COMPLETE TEAR OF LEFT ROTATOR CUFF, INITIAL ENCOUNTER: ICD-10-CM

## 2025-03-17 RX ORDER — TRAMADOL HYDROCHLORIDE 50 MG/1
50 TABLET ORAL EVERY 4 HOURS PRN
Qty: 36 TABLET | Refills: 0 | Status: SHIPPED | OUTPATIENT
Start: 2025-03-17 | End: 2025-03-23

## 2025-03-21 ENCOUNTER — OFFICE VISIT (OUTPATIENT)
Dept: URGENT CARE | Age: 65
End: 2025-03-21
Payer: COMMERCIAL

## 2025-03-21 VITALS
DIASTOLIC BLOOD PRESSURE: 87 MMHG | WEIGHT: 198 LBS | HEART RATE: 83 BPM | SYSTOLIC BLOOD PRESSURE: 127 MMHG | OXYGEN SATURATION: 97 % | BODY MASS INDEX: 35.07 KG/M2 | RESPIRATION RATE: 16 BRPM | TEMPERATURE: 98.3 F

## 2025-03-21 DIAGNOSIS — L02.91 ABSCESS: Primary | ICD-10-CM

## 2025-03-21 RX ORDER — DOXYCYCLINE 100 MG/1
100 CAPSULE ORAL 2 TIMES DAILY
Qty: 28 CAPSULE | Refills: 0 | Status: SHIPPED | OUTPATIENT
Start: 2025-03-21 | End: 2025-04-04

## 2025-03-21 ASSESSMENT — ENCOUNTER SYMPTOMS
HEMATOLOGIC/LYMPHATIC NEGATIVE: 1
CONSTITUTIONAL NEGATIVE: 1
ROS SKIN COMMENTS: ABSCESS
PSYCHIATRIC NEGATIVE: 1
ENDOCRINE NEGATIVE: 1
MUSCULOSKELETAL NEGATIVE: 1
EYES NEGATIVE: 1
CARDIOVASCULAR NEGATIVE: 1
RESPIRATORY NEGATIVE: 1
NEUROLOGICAL NEGATIVE: 1
GASTROINTESTINAL NEGATIVE: 1
ALLERGIC/IMMUNOLOGIC NEGATIVE: 1

## 2025-03-21 ASSESSMENT — PAIN SCALES - GENERAL: PAINLEVEL_OUTOF10: 7

## 2025-03-21 NOTE — PROGRESS NOTES
Subjective   Patient ID: Lulu Celaya is a 64 y.o. female. They present today with a chief complaint of Mass (Middle of chest x 4 days ).    History of Present Illness    History provided by:  Patient   used: No    Other  Location:  Abscess  Onset quality:  Gradual  Duration:  4 days  Timing:  Constant  Chronicity:  New  Context:  Center of chest      Past Medical History  Allergies as of 03/21/2025    (No Known Allergies)       (Not in a hospital admission)       Past Medical History:   Diagnosis Date    Hyperlipidemia     Hypertension     Hypokalemia        Past Surgical History:   Procedure Laterality Date    COLONOSCOPY      DENTAL SURGERY      teeth removed Decemeber 2024 (6 weeks ago)    ECTOPIC PREGNANCY SURGERY      in late 1980s    UPPER GASTROINTESTINAL ENDOSCOPY          reports that she has been smoking cigarettes. She has never used smokeless tobacco. She reports that she does not currently use alcohol. She reports that she does not use drugs.    Review of Systems  Review of Systems   Constitutional: Negative.    HENT: Negative.     Eyes: Negative.    Respiratory: Negative.     Cardiovascular: Negative.    Gastrointestinal: Negative.    Endocrine: Negative.    Genitourinary: Negative.    Musculoskeletal: Negative.    Skin:         abscess   Allergic/Immunologic: Negative.    Neurological: Negative.    Hematological: Negative.    Psychiatric/Behavioral: Negative.     All other systems reviewed and are negative.                                 Objective    Vitals:    03/21/25 1840   BP: 127/87   Pulse: 83   Resp: 16   Temp: 36.8 °C (98.3 °F)   TempSrc: Oral   SpO2: 97%   Weight: 89.8 kg (198 lb)     No LMP recorded (lmp unknown). Patient is postmenopausal.    Physical Exam  Vitals and nursing note reviewed.   Constitutional:       Appearance: Normal appearance. She is normal weight.   Cardiovascular:      Rate and Rhythm: Normal rate and regular rhythm.      Pulses: Normal  pulses.      Heart sounds: Normal heart sounds.   Pulmonary:      Effort: Pulmonary effort is normal.      Breath sounds: Normal breath sounds.   Abdominal:      General: Bowel sounds are normal.      Palpations: Abdomen is soft.   Skin:     General: Skin is warm.      Findings: Abscess and erythema present.      Comments: 3 cm raised fluctuant abscess to center of chest tender to touch    Neurological:      General: No focal deficit present.      Mental Status: She is alert and oriented to person, place, and time. Mental status is at baseline.   Psychiatric:         Mood and Affect: Mood normal.         Behavior: Behavior normal.         Thought Content: Thought content normal.         Judgment: Judgment normal.         Procedures    Point of Care Test & Imaging Results from this visit  No results found for this visit on 03/21/25.   No results found.    Diagnostic study results (if any) were reviewed by PAUL Felder.    Assessment/Plan   Allergies, medications, history, and pertinent labs/EKGs/Imaging reviewed by PAUL Felder.     Medical Decision Making  Medical Decision Making  At time of discharge patient was clinically well-appearing and HDS for outpatient management. The patient and/or family was educated regarding diagnosis, supportive care, OTC and Rx medications. The patient and/or family was given the opportunity to ask questions prior to discharge.  They verbalized understanding of my discussion of the plans for treatment, expected course, indications to return to  or seek further evaluation in ED, and the need for timely follow up as directed.   They were provided with a work/school excuse if requested.        Orders and Diagnoses  Diagnoses and all orders for this visit:  Abscess  -     doxycycline (Vibramycin) 100 mg capsule; Take 1 capsule (100 mg) by mouth 2 times a day for 14 days. Take with at least 8 ounces (large glass) of water, do not lie down for 30 minutes  after  -     Referral to Dermatology    Attempted to I and D abscess, applied LET, every time abscess was touched, pt flinched violently despite topical anesthetic.  Patient and daughter stated they did not feel confident in me draining abscess.  Will refer to dermatology.  Start oral antibiotic.      Return to Urgent care if symptoms return or progress  Follow up with PCP in 1-2 weeks       Patient disposition: Home    Electronically signed by Sumaya Guardado, JOS-RADHA  7:40 PM

## 2025-04-02 DIAGNOSIS — M75.122 COMPLETE TEAR OF LEFT ROTATOR CUFF, UNSPECIFIED WHETHER TRAUMATIC: Primary | ICD-10-CM

## 2025-04-02 RX ORDER — TRAMADOL HYDROCHLORIDE 50 MG/1
50 TABLET ORAL EVERY 4 HOURS PRN
Qty: 36 TABLET | Refills: 0 | Status: CANCELLED | OUTPATIENT
Start: 2025-04-02 | End: 2025-04-08

## 2025-04-08 RX ORDER — MELOXICAM 7.5 MG/1
7.5 TABLET ORAL DAILY
Qty: 30 TABLET | Refills: 0 | Status: SHIPPED | OUTPATIENT
Start: 2025-04-08 | End: 2025-05-08

## 2025-05-06 DIAGNOSIS — M75.122 COMPLETE TEAR OF LEFT ROTATOR CUFF, UNSPECIFIED WHETHER TRAUMATIC: ICD-10-CM

## 2025-05-06 RX ORDER — MELOXICAM 7.5 MG/1
7.5 TABLET ORAL DAILY
Qty: 30 TABLET | Refills: 0 | Status: SHIPPED | OUTPATIENT
Start: 2025-05-06 | End: 2025-05-09 | Stop reason: SDUPTHER

## 2025-05-09 ENCOUNTER — OFFICE VISIT (OUTPATIENT)
Dept: ORTHOPEDIC SURGERY | Facility: HOSPITAL | Age: 65
End: 2025-05-09
Payer: COMMERCIAL

## 2025-05-09 DIAGNOSIS — Z09 FOLLOW-UP EXAMINATION AFTER ORTHOPEDIC SURGERY: Primary | ICD-10-CM

## 2025-05-09 DIAGNOSIS — M75.122 COMPLETE TEAR OF LEFT ROTATOR CUFF, UNSPECIFIED WHETHER TRAUMATIC: ICD-10-CM

## 2025-05-09 PROCEDURE — 99213 OFFICE O/P EST LOW 20 MIN: CPT | Performed by: NURSE PRACTITIONER

## 2025-05-09 RX ORDER — LOSARTAN POTASSIUM AND HYDROCHLOROTHIAZIDE 12.5; 1 MG/1; MG/1
1 TABLET ORAL
COMMUNITY
Start: 2025-05-01

## 2025-05-09 RX ORDER — MELOXICAM 7.5 MG/1
7.5 TABLET ORAL DAILY
Qty: 90 TABLET | Refills: 0 | Status: SHIPPED | OUTPATIENT
Start: 2025-05-09 | End: 2025-08-07

## 2025-05-09 NOTE — PROGRESS NOTES
Provider Impression/Assessment:  Lulu Celaya is +3 months status post Left Shoulder Arthroscopic Rotator Cuff Repair, Decompression, Debridement, done 1/23/25. Workers Compensation injury.    Patient Discussion/Plan:  Lulu Celaya will now initiate more strengthening with physical therapy. A new prescription was given today. They can start to lift up to 5-10 pounds as tolerated with therapy. They will continue to focus on range of motion and scapular stabilizers daily at home. They should continue to use caution with overhead lift. They should be seen again in clinic in 4-6 weeks for a repeat clinical check and likely progression of strengthening activities. They understand that it can take up to 5-6 months, up through a full year to get the full benefit from surgery.     A follow up appointment was made for Lulu Celaya today. All patient's questions were answered today to their satisfaction and they are in agreement with the above plan. She will remain off work at this time due to limitations of her left shoulder. She can take either Mobic as prescribed daily or Advil, but she should not take BOTH medications on the same days and they are both NSAIDs. She verbalized understanding of this.     Patient was discussed with Dr Meyer and he agrees with the above plan.    Should you have any questions or concerns after your visit today, please do not hesitate to call the office at [phone]. We strive to give you high-quality, patient-centered, collaborative care and I am honored to be a part of your health care team.    --------------------------------------------------------------------------------------------------------  CHIEF COMPLAINT:  POV: L RCR  DOS: 1/23/25  [facility]    History of Present Illness:  3/15/25  Lulu Celaya is a 64 y.o. female returning to the clinic for a second post operative visit regarding her left shoulder. She is s/p Left Shoulder Arthroscopic Rotator Cuff  Repair, Decompression, Debridement, done 1/23/25. Biceps spontaneously ruptured prior to surgery. She explains she has been feeling a new pain since she has started therapy.     5/9/25  Lulu is a pleasant 65 y.o. female returning for follow up of Left Shoulder Arthroscopic Rotator Cuff Repair, Decompression, Debridement, done 1/23/25.  She states her shoulder is continuing to improve.  She continues to do physical therapy, who has recommended she complete 8-12 more weeks of formal physical therapy. She has not started to strengthen yet. She reports doing well at this point in their recovery after surgery. She does feel like she is improving, she is just not anywhere near where she would like to be. She continues to have 8/10 pain with therapy. She has been prescribed mobic for this. She will also alternate with Advil. Sleeping better now without difficulty. Denies redness or warmth at incision site. Denies any fever, chills, or paresthesia. They have been compliant with restrictions throughout the therapy. Doing well with daily home therapy and weekly therapy sessions.      Review of Systems:   Review of systems for all 14 systems is negative for complaint except for the above noted       findings in the history of present illness.    Allergies:  Allergies[1]    Medications:  Current Medications[2]    Diagnoses/Problems:  Left rotator cuff tear     Physical Examination:  Examination of left shoulder reveals incisions are dry, intact and well healed. No swelling. No warmth or erythema. No ecchymosis. No pain with internal/external rotation. Neurovascularly intact distally. 5 out of 5 wrist, hand and thumb flexion and extension without pain. Full active range of motion of elbow.  She can actively elevate to about 90° of forward elevation, passively correctable to about 140° without guarding. External rotations of 60°. Internal rotation to L3. Abduction to 60°.     Results/Imaging:  No images are attached to the  encounter.      *While intending to generate a timely document that accurately reflects the content of the visit, no guarantee can be provided that every grammatical or spelling mistake has been or will be identified or corrected. Thank you for your understanding.         [1] No Known Allergies  [2]   Current Outpatient Medications:     losartan-hydrochlorothiazide (Hyzaar) 100-12.5 mg tablet, Take 1 tablet by mouth once daily., Disp: , Rfl:     aspirin 81 mg EC tablet, Take 1 tablet (81 mg) by mouth once daily as needed for mild pain (1 - 3)., Disp: , Rfl:     atorvastatin (Lipitor) 20 mg tablet, Take 1 tablet (20 mg) by mouth once daily., Disp: , Rfl:     chlorhexidine (Peridex) 0.12 % solution, Take 15 mL by mouth twice a day. (Patient not taking: Reported on 1/23/2025), Disp: , Rfl:     chlorthalidone (Hygroton) 25 mg tablet, Take 1 tablet (25 mg) by mouth once daily., Disp: , Rfl:     lisinopril 10 mg tablet, Take 1 tablet (10 mg) by mouth once daily., Disp: , Rfl:     meloxicam (Mobic) 7.5 mg tablet, Take 1 tablet (7.5 mg) by mouth once daily., Disp: 90 tablet, Rfl: 0    omeprazole (PriLOSEC) 40 mg DR capsule, Take 1 capsule (40 mg) by mouth once daily in the morning. Take before meals for 3 days. Do not crush or chew., Disp: 3 capsule, Rfl: 0    potassium chloride ER (Micro-K) 10 mEq ER capsule, Take 1 capsule (10 mEq) by mouth 2 times a day. Do not crush or chew. Patient unsure of dose, taking times 2 weeks, Disp: , Rfl:

## 2025-06-12 ENCOUNTER — APPOINTMENT (OUTPATIENT)
Dept: DERMATOLOGY | Facility: CLINIC | Age: 65
End: 2025-06-12
Payer: COMMERCIAL

## 2025-06-12 DIAGNOSIS — L21.9 SEBORRHEIC DERMATITIS: ICD-10-CM

## 2025-06-12 DIAGNOSIS — L85.3 XEROSIS CUTIS: Primary | ICD-10-CM

## 2025-06-12 PROCEDURE — 1159F MED LIST DOCD IN RCRD: CPT | Performed by: NURSE PRACTITIONER

## 2025-06-12 PROCEDURE — 99204 OFFICE O/P NEW MOD 45 MIN: CPT | Performed by: NURSE PRACTITIONER

## 2025-06-12 PROCEDURE — 1160F RVW MEDS BY RX/DR IN RCRD: CPT | Performed by: NURSE PRACTITIONER

## 2025-06-12 RX ORDER — AMMONIUM LACTATE 12 G/100G
CREAM TOPICAL
Qty: 385 G | Refills: 9 | Status: SHIPPED | OUTPATIENT
Start: 2025-06-12

## 2025-06-12 RX ORDER — FLUOCINONIDE TOPICAL SOLUTION USP, 0.05% 0.5 MG/ML
SOLUTION TOPICAL
Qty: 60 ML | Refills: 3 | Status: SHIPPED | OUTPATIENT
Start: 2025-06-12

## 2025-06-12 ASSESSMENT — DERMATOLOGY QUALITY OF LIFE (QOL) ASSESSMENT
WHAT SINGLE SKIN CONDITION LISTED BELOW IS THE PATIENT ANSWERING THE QUALITY-OF-LIFE ASSESSMENT QUESTIONS ABOUT: NONE OF THE ABOVE
RATE HOW BOTHERED YOU ARE BY SYMPTOMS OF YOUR SKIN PROBLEM (EG, ITCHING, STINGING BURNING, HURTING OR SKIN IRRITATION): 1
RATE HOW BOTHERED YOU ARE BY SYMPTOMS OF YOUR SKIN PROBLEM (EG, ITCHING, STINGING BURNING, HURTING OR SKIN IRRITATION): 1
WHAT SINGLE SKIN CONDITION LISTED BELOW IS THE PATIENT ANSWERING THE QUALITY-OF-LIFE ASSESSMENT QUESTIONS ABOUT: NONE OF THE ABOVE

## 2025-06-12 NOTE — Clinical Note
Often itchy, dry skin is caused by environmental factors, such as cold weather and frequent bathing, and by medical conditions, such as atopic dermatitis and malnutrition. Dry skin develops due to a decrease in the natural oils in the outer layer of skin, which makes the skin lose water.    Healthy bathing habits can improve dry skin:  - Take a bath or shower only once daily. More frequent bathing can make the skin lose water (dehydrate).   - Use lukewarm (not hot) water.   - Limit bath time to 15 minutes.   - Avoid harsh deodorant soaps (or limit their use to armpits, groin, and feet).   - Use non-soap cleansers.    -Pat (don't rub) the skin dry after bathing.   - Apply moisturizer immediately after bathing, while the skin is still moist.   - When choosing a moisturizer, look for oil-based creams and ointments, which work better than water-based lotions.     The following over-the-counter products may be helpful:  - Petrolatum or petroleum jelly (Vaseline)   - Fragrance-free creams or ointments   - Preparations containing alpha-hydroxy acids such as glycolic acid or lactic acid   - Creams containing urea   - Over-the-counter cortisone cream (if the areas are itchy)   - Topical antibiotics applied immediately to any cracks in the skin to help prevent infection    Plan  - Follow abovementioned guidelines. Start ammonium lactate, use as directed.  - Risks, benefits, side effects, alternatives and options were discussed with patient and the patient voiced understanding.

## 2025-06-13 NOTE — PROGRESS NOTES
Subjective     Lulu Celaya is a 65 y.o. female who presents for the following: Suspicious Skin Lesion (Chest; referred for abscess - s/p antibiotic therapy ).     Intake Questions  Do you have any new or changing Lesions?: (Patient-Rptd) (P) No  Are you an organ transplant recipient?: (Patient-Rptd) (P) No  Have you had or do you have a Staph Infection?: (Patient-Rptd) (P) No  Have you had or do you have Vacular Disease?: (Patient-Rptd) (P) No  Do you use sunscreen?: (Patient-Rptd) (P) None  Do you use a tanning bed?: (Patient-Rptd) (P) No  Are you on birth control?: (Patient-Rptd) (P) No  Do you have irregular menstrual cycles?: (Patient-Rptd) (P) No    Review of Systems:  No other skin or systemic complaints other than what is documented elsewhere in the note.    The following portions of the chart were reviewed this encounter and updated as appropriate:  Tobacco  Allergies  Meds  Problems  Med Hx  Surg Hx  Fam Hx         Skin Cancer History  Biopsy Log Book  No skin cancers from Specimen Tracking.    Additional History      Specialty Problems    None    Past Medical History:  Lulu Celaya  has a past medical history of Hyperlipidemia, Hypertension, and Hypokalemia.    She has no past medical history of Anemia, Anxiety, Asthma, Awareness under anesthesia, Cerebral vascular accident (Multi), CHF (congestive heart failure), Chronic kidney disease, COPD (chronic obstructive pulmonary disease) (Multi), Coronary artery disease, Delayed emergence from general anesthesia, Depression, Disease of thyroid gland, GERD (gastroesophageal reflux disease), History of blood transfusion, HL (hearing loss), Malignant hyperthermia, Myocardial infarction (Multi), Refusal of blood product, Seizure disorder (Multi), Stroke (Multi), Type 2 diabetes mellitus, or Vision loss.    Past Surgical History:  Lulu Celaya  has a past surgical history that includes Colonoscopy; Upper gastrointestinal endoscopy;  Ectopic pregnancy surgery; and Dental surgery.    Family History:  Patient family history includes Breast cancer in her mother; Heart attack in her father; Heart disease in her father and paternal grandfather.    Social History:  Lulu Celaya  reports that she has been smoking cigarettes. She has never used smokeless tobacco. She reports that she does not currently use alcohol. She reports that she does not use drugs.    Allergies:  Patient has no known allergies.    Current Medications / CAM's:  Current Medications[1]     Objective   Well appearing patient in no apparent distress; mood and affect are within normal limits.    A focused skin examination was performed. All findings within normal limits unless otherwise noted below.    Assessment/Plan   Skin Exam  1. XEROSIS CUTIS  Generalized  Fine, ashy, pale to white scale diffusely over skin.  Often itchy, dry skin is caused by environmental factors, such as cold weather and frequent bathing, and by medical conditions, such as atopic dermatitis and malnutrition. Dry skin develops due to a decrease in the natural oils in the outer layer of skin, which makes the skin lose water.    Healthy bathing habits can improve dry skin:  - Take a bath or shower only once daily. More frequent bathing can make the skin lose water (dehydrate).   - Use lukewarm (not hot) water.   - Limit bath time to 15 minutes.   - Avoid harsh deodorant soaps (or limit their use to armpits, groin, and feet).   - Use non-soap cleansers.    -Pat (don't rub) the skin dry after bathing.   - Apply moisturizer immediately after bathing, while the skin is still moist.   - When choosing a moisturizer, look for oil-based creams and ointments, which work better than water-based lotions.     The following over-the-counter products may be helpful:  - Petrolatum or petroleum jelly (Vaseline)   - Fragrance-free creams or ointments   - Preparations containing alpha-hydroxy acids such as glycolic acid or  "lactic acid   - Creams containing urea   - Over-the-counter cortisone cream (if the areas are itchy)   - Topical antibiotics applied immediately to any cracks in the skin to help prevent infection    Plan  - Follow abovementioned guidelines. Start ammonium lactate, use as directed.  - Risks, benefits, side effects, alternatives and options were discussed with patient and the patient voiced understanding.    Related Medications  ammonium lactate (Amlactin) 12 % cream  To arms, legs, and trunk daily after shower.  2. SEBORRHEIC DERMATITIS  Scalp  Erythema with overlying greasy scale.  -Discussed the nature of the diagnosis  -There is no \"cure\" for this condition. Treatments will need to be continued long term to treat the condition  -Recommend: Start fluocinonide solution,  use as directed.   - Risks, benefits, and side effects discussed. Patient understood and agrees with the plan.     Related Medications  fluocinonide (Lidex) 0.05 % external solution  To scalp daily for 1-2 weeks, as needed for itch/inflammation.    Follow up in 6 months. Please call me if there are any changes or development of concerning symptoms (lesion/skin condition is changing, bleeding, enlarging, or worsening).         [1]   Current Outpatient Medications:     ammonium lactate (Amlactin) 12 % cream, To arms, legs, and trunk daily after shower., Disp: 385 g, Rfl: 9    aspirin 81 mg EC tablet, Take 1 tablet (81 mg) by mouth once daily as needed for mild pain (1 - 3)., Disp: , Rfl:     atorvastatin (Lipitor) 20 mg tablet, Take 1 tablet (20 mg) by mouth once daily., Disp: , Rfl:     chlorhexidine (Peridex) 0.12 % solution, Take 15 mL by mouth twice a day. (Patient not taking: Reported on 1/23/2025), Disp: , Rfl:     chlorthalidone (Hygroton) 25 mg tablet, Take 1 tablet (25 mg) by mouth once daily., Disp: , Rfl:     fluocinonide (Lidex) 0.05 % external solution, To scalp daily for 1-2 weeks, as needed for itch/inflammation., Disp: 60 mL, Rfl: 3   "  lisinopril 10 mg tablet, Take 1 tablet (10 mg) by mouth once daily., Disp: , Rfl:     losartan-hydrochlorothiazide (Hyzaar) 100-12.5 mg tablet, Take 1 tablet by mouth once daily., Disp: , Rfl:     meloxicam (Mobic) 7.5 mg tablet, TAKE 1 TABLET BY MOUTH ONCE DAILY., Disp: 30 tablet, Rfl: 1    omeprazole (PriLOSEC) 40 mg DR capsule, Take 1 capsule (40 mg) by mouth once daily in the morning. Take before meals for 3 days. Do not crush or chew., Disp: 3 capsule, Rfl: 0    potassium chloride ER (Micro-K) 10 mEq ER capsule, Take 1 capsule (10 mEq) by mouth 2 times a day. Do not crush or chew. Patient unsure of dose, taking times 2 weeks, Disp: , Rfl:

## 2025-07-08 ENCOUNTER — OFFICE VISIT (OUTPATIENT)
Dept: ORTHOPEDIC SURGERY | Facility: HOSPITAL | Age: 65
End: 2025-07-08
Payer: COMMERCIAL

## 2025-07-08 VITALS — BODY MASS INDEX: 35.97 KG/M2 | WEIGHT: 203 LBS | HEIGHT: 63 IN

## 2025-07-08 DIAGNOSIS — Z09 FOLLOW-UP EXAMINATION AFTER ORTHOPEDIC SURGERY: Primary | ICD-10-CM

## 2025-07-08 DIAGNOSIS — Z98.890 STATUS POST ARTHROSCOPY OF LEFT SHOULDER: ICD-10-CM

## 2025-07-08 DIAGNOSIS — M25.512 LEFT SHOULDER PAIN, UNSPECIFIED CHRONICITY: ICD-10-CM

## 2025-07-08 PROCEDURE — 99212 OFFICE O/P EST SF 10 MIN: CPT

## 2025-07-08 PROCEDURE — 99214 OFFICE O/P EST MOD 30 MIN: CPT | Performed by: NURSE PRACTITIONER

## 2025-07-08 ASSESSMENT — PAIN DESCRIPTION - DESCRIPTORS: DESCRIPTORS: SHARP

## 2025-07-08 ASSESSMENT — PAIN - FUNCTIONAL ASSESSMENT: PAIN_FUNCTIONAL_ASSESSMENT: 0-10

## 2025-07-08 ASSESSMENT — PAIN SCALES - GENERAL: PAINLEVEL_OUTOF10: 4

## 2025-07-08 NOTE — PROGRESS NOTES
Provider Impression/Assessment:  Lulu Celaya is 5 months status post Left Shoulder Arthroscopic Rotator Cuff Repair, Decompression, Debridement, done 1/23/25. Workers Compensation injury. Continued pain     Patient Discussion/Plan:  Lulu Celaya will continue with strengthening with physical therapy 2x per week as scheduled. She is showing improvement with her range of motion since last visit which is encouraging. She will continue with 5-10 pounds as tolerated with therapy. She is encouraged to focus on range of motion and scapular stabilizers daily at home. This was discussed at length today with the patient. She will continue to use caution with overhead lift.     Since she is continuing to struggle with pain, we will submit for a steroid injection for her left shoulder.     She will remain off work at this time due to limitations of her left shoulder. She can take either Mobic as prescribed daily or Advil, but she should not take BOTH medications on the same days and they are both NSAIDs. She verbalized understanding of this.     We have scheduled to have her return to clinic in 6-8 weeks for a repeat clinical check with Dr Meyer and hopefully approval of injection at that time. She understands that it can take up to 6 months, up through a full year to get the full benefit from surgery.      All patient's questions were answered today to their satisfaction and they are in agreement with the above plan. Patient was discussed with Dr Meyer and he agrees with the above plan.     Should you have any questions or concerns after your visit today, please do not hesitate to call the office at [phone]. We strive to give you high-quality, patient-centered, collaborative care and I am honored to be a part of your health care team.    --------------------------------------------------------------------------------------------------------  CHIEF COMPLAINT:  POV: L RCR  DOS: 1/23/25  [facility]      History of Present Illness:  3/15/25  Lulu Celaya is a 64 y.o. female returning to the clinic for a second post operative visit regarding her left shoulder. She is s/p Left Shoulder Arthroscopic Rotator Cuff Repair, Decompression, Debridement, done 1/23/25. Biceps spontaneously ruptured prior to surgery. She explains she has been feeling a new pain since she has started therapy.      5/9/25  Lulu is a pleasant 65 y.o. female returning for follow up of Left Shoulder Arthroscopic Rotator Cuff Repair, Decompression, Debridement, done 1/23/25.  She states her shoulder is continuing to improve.  She continues to do physical therapy, who has recommended she complete 8-12 more weeks of formal physical therapy. She has not started to strengthen yet. She reports doing well at this point in their recovery after surgery. She does feel like she is improving, she is just not anywhere near where she would like to be. She continues to have 8/10 pain with therapy. She has been prescribed mobic for this. She will also alternate with Advil. Sleeping better now without difficulty. Denies redness or warmth at incision site. Denies any fever, chills, or paresthesia. They have been compliant with restrictions throughout the therapy. Doing well with daily home therapy and weekly therapy sessions.      7/8/25  Lulu Celaya is +5 months status post Left Shoulder Arthroscopic Rotator Cuff Repair, Decompression, Debridement, done 1/23/25. Workers Compensation injury.     Continues to have daily pain with her shoulder. She will take anti-inflammatory as needed. Admits this does not help her much. She continues to work with PT weekly. She will so her stretches at home when she remembers. She is unable to reach the top of her head, which is tough for her to style her hair still. She has enrolled in water aerobics, hoping that this will help her move her shoulder. She is working with resistance bands with PT, 2x per week.       Review of Systems:   Review of systems for all 14 systems is negative for complaint except for the above noted       findings in the history of present illness.     Allergies:  [Allergies]    [Allergies]  No Known Allergies     Medications:  [Current Medications]    [Current Medications]     Current Outpatient Medications:     losartan-hydrochlorothiazide (Hyzaar) 100-12.5 mg tablet, Take 1 tablet by mouth once daily., Disp: , Rfl:     aspirin 81 mg EC tablet, Take 1 tablet (81 mg) by mouth once daily as needed for mild pain (1 - 3)., Disp: , Rfl:     atorvastatin (Lipitor) 20 mg tablet, Take 1 tablet (20 mg) by mouth once daily., Disp: , Rfl:     chlorhexidine (Peridex) 0.12 % solution, Take 15 mL by mouth twice a day. (Patient not taking: Reported on 1/23/2025), Disp: , Rfl:     chlorthalidone (Hygroton) 25 mg tablet, Take 1 tablet (25 mg) by mouth once daily., Disp: , Rfl:     lisinopril 10 mg tablet, Take 1 tablet (10 mg) by mouth once daily., Disp: , Rfl:     meloxicam (Mobic) 7.5 mg tablet, Take 1 tablet (7.5 mg) by mouth once daily., Disp: 90 tablet, Rfl: 0    omeprazole (PriLOSEC) 40 mg DR capsule, Take 1 capsule (40 mg) by mouth once daily in the morning. Take before meals for 3 days. Do not crush or chew., Disp: 3 capsule, Rfl: 0    potassium chloride ER (Micro-K) 10 mEq ER capsule, Take 1 capsule (10 mEq) by mouth 2 times a day. Do not crush or chew. Patient unsure of dose, taking times 2 weeks, Disp: , Rfl:      Diagnoses/Problems:  Left rotator cuff tear   S/p rotator cuff repair     Physical Examination:  Patient is a well-developed well-nourished female in no acute distress. Breathes with normal chest rises. Eye exam reveals round pupils. Awake alert and oriented x3.     Examination of right shoulder reveals range of motion 0-170° of forward elevation. 0-60° of external rotation. Internal rotation was to T12.     Examination of left shoulder reveals incisions are well healed. No swelling. No warmth  or erythema. No ecchymosis. No pain with internal/external rotation. Neurovascularly intact distally. 5 out of 5 wrist, hand and thumb flexion and extension without pain. Full active range of motion of elbow. She can actively elevate to about 110° of forward elevation, passively correctable to about 140° with mild guarding. External rotations of 60°. Internal rotation to L3. Abduction to 90°, improvement.  Jobes 4+/5  IR/ER resistance 3+/5      Results/Imaging:  No images are attached to the encounter.        *While intending to generate a timely document that accurately reflects the content of the visit, no guarantee can be provided that every grammatical or spelling mistake has been or will be identified or corrected. Thank you for your understanding.

## 2025-08-19 ENCOUNTER — OFFICE VISIT (OUTPATIENT)
Dept: ORTHOPEDIC SURGERY | Facility: HOSPITAL | Age: 65
End: 2025-08-19
Payer: COMMERCIAL

## 2025-08-19 DIAGNOSIS — M25.512 LEFT SHOULDER PAIN, UNSPECIFIED CHRONICITY: Primary | ICD-10-CM

## 2025-08-19 PROCEDURE — 20610 DRAIN/INJ JOINT/BURSA W/O US: CPT | Mod: LT | Performed by: ORTHOPAEDIC SURGERY

## 2025-08-19 PROCEDURE — 99213 OFFICE O/P EST LOW 20 MIN: CPT | Performed by: ORTHOPAEDIC SURGERY

## 2025-08-19 PROCEDURE — 99211 OFF/OP EST MAY X REQ PHY/QHP: CPT | Mod: 25

## 2025-08-19 PROCEDURE — 2500000004 HC RX 250 GENERAL PHARMACY W/ HCPCS (ALT 636 FOR OP/ED): Performed by: ORTHOPAEDIC SURGERY

## 2025-08-19 RX ADMIN — TRIAMCINOLONE ACETONIDE 40 MG: 400 INJECTION, SUSPENSION INTRA-ARTICULAR; INTRAMUSCULAR at 14:10

## 2025-08-19 RX ADMIN — LIDOCAINE HYDROCHLORIDE 4 ML: 10 INJECTION, SOLUTION INFILTRATION; PERINEURAL at 14:10

## 2025-08-24 RX ORDER — LIDOCAINE HYDROCHLORIDE 10 MG/ML
4 INJECTION, SOLUTION INFILTRATION; PERINEURAL
Status: COMPLETED | OUTPATIENT
Start: 2025-08-19 | End: 2025-08-19

## 2025-08-24 RX ORDER — TRIAMCINOLONE ACETONIDE 40 MG/ML
40 INJECTION, SUSPENSION INTRA-ARTICULAR; INTRAMUSCULAR
Status: COMPLETED | OUTPATIENT
Start: 2025-08-19 | End: 2025-08-19

## 2025-12-15 ENCOUNTER — APPOINTMENT (OUTPATIENT)
Dept: DERMATOLOGY | Facility: CLINIC | Age: 65
End: 2025-12-15
Payer: COMMERCIAL

## (undated) DEVICE — DRAPE, SHEET, 17 X 23 IN

## (undated) DEVICE — NEEDLE, SCORPION, HD

## (undated) DEVICE — GLOVE, SURGICAL, PROTEXIS PI ORTHO, 8.5, PF, LF

## (undated) DEVICE — SUTURE, ETHILON, 3-0, 18 IN, PS1, BLACK

## (undated) DEVICE — DRESSING, GAUZE, PETROLATUM, PATCH, XEROFORM, 1 X 8 IN, STERILE

## (undated) DEVICE — STRIP, SKIN CLOSURE, STERI STRIP, REINFORCED, 0.5 X 4 IN

## (undated) DEVICE — CANNULA, FLEXIBLE 6.5 X 72 THREAD CLEAR TRAC

## (undated) DEVICE — ELECTRODE, ELECTROSURGICAL, BLADE, INSULATED, ENT/IMA, STERILE

## (undated) DEVICE — GLOVE, SURGICAL, PROTEXIS PI , 8.5, PF, LF

## (undated) DEVICE — KIT, DISPOSABLE, FOR 2.8 Q-FIX SHOULDER

## (undated) DEVICE — KIT, BEACH CHAIR TRIMANO

## (undated) DEVICE — WAND, COBLATION, WEREWOLF FLOW 90

## (undated) DEVICE — Device

## (undated) DEVICE — SUTURE, VICRYL PLUS, 2-0, SH, 1X27IN, UNDYED

## (undated) DEVICE — DRESSING, TRANSPARENT, TEGADERM, FRAME STYLE, 4 X 4.5, STRL

## (undated) DEVICE — POWERRASP, 5.5MM X 13CM

## (undated) DEVICE — TUBING, PUMP REDEUCE 8FT STERILE

## (undated) DEVICE — BLANKET, LOWER BODY, VHA PLUS, ADULT

## (undated) DEVICE — DRAPE, SHEET, U, STERI DRAPE, 47 X 51 IN, DISPOSABLE, STERILE

## (undated) DEVICE — PROBE, APOLLO RF, 90 DEG, EXTRA LARTGE

## (undated) DEVICE — SPONGE, GAUZE, AVANT, STERILE, NONWOVEN, 4PLY, 4 X 4, STANDARD

## (undated) DEVICE — BLADE, SHAVER 4.5 INCISOR + PLATINUM

## (undated) DEVICE — MASK, FACE, TENET, FOAM POSITIONING, DISPOSABLE

## (undated) DEVICE — TUBING, PATIENT 8FT STERILE

## (undated) DEVICE — EXCAL 4MM X 13CM SINGLE

## (undated) DEVICE — DRESSING, ABDOMINAL, TENDERSORB, 8 X 7-1/2 IN, STERILE

## (undated) DEVICE — TUBING, DUAL WAVE, OUTFLOW

## (undated) DEVICE — GOWN, ASTOUND, XL

## (undated) DEVICE — DRESSING, GAUZE, SPONGE, VERSALON, 4 PLY, 2 X 2 IN, STERILE

## (undated) DEVICE — CANNULA, BUTTON, PASSPORT, 8MM X 4CM